# Patient Record
Sex: FEMALE | Race: BLACK OR AFRICAN AMERICAN | NOT HISPANIC OR LATINO
[De-identification: names, ages, dates, MRNs, and addresses within clinical notes are randomized per-mention and may not be internally consistent; named-entity substitution may affect disease eponyms.]

---

## 2017-09-05 ENCOUNTER — TRANSCRIPTION ENCOUNTER (OUTPATIENT)
Age: 27
End: 2017-09-05

## 2020-01-20 ENCOUNTER — TRANSCRIPTION ENCOUNTER (OUTPATIENT)
Age: 30
End: 2020-01-20

## 2020-07-20 ENCOUNTER — TRANSCRIPTION ENCOUNTER (OUTPATIENT)
Age: 30
End: 2020-07-20

## 2021-01-20 ENCOUNTER — TRANSCRIPTION ENCOUNTER (OUTPATIENT)
Age: 31
End: 2021-01-20

## 2021-09-27 ENCOUNTER — TRANSCRIPTION ENCOUNTER (OUTPATIENT)
Age: 31
End: 2021-09-27

## 2022-11-07 ENCOUNTER — NON-APPOINTMENT (OUTPATIENT)
Age: 32
End: 2022-11-07

## 2022-11-08 PROBLEM — Z00.00 ENCOUNTER FOR PREVENTIVE HEALTH EXAMINATION: Status: ACTIVE | Noted: 2022-11-08

## 2022-11-09 ENCOUNTER — APPOINTMENT (OUTPATIENT)
Dept: ANTEPARTUM | Facility: CLINIC | Age: 32
End: 2022-11-09

## 2022-11-09 ENCOUNTER — ASOB RESULT (OUTPATIENT)
Age: 32
End: 2022-11-09

## 2022-11-09 PROCEDURE — 76801 OB US < 14 WKS SINGLE FETUS: CPT

## 2022-11-10 ENCOUNTER — APPOINTMENT (OUTPATIENT)
Dept: ANTEPARTUM | Facility: CLINIC | Age: 32
End: 2022-11-10

## 2022-11-10 ENCOUNTER — ASOB RESULT (OUTPATIENT)
Age: 32
End: 2022-11-10

## 2022-11-10 PROCEDURE — 36415 COLL VENOUS BLD VENIPUNCTURE: CPT

## 2022-11-10 PROCEDURE — 76813 OB US NUCHAL MEAS 1 GEST: CPT

## 2022-11-16 LAB
ADDITIONAL US: NORMAL
COMMENTS: AFP: NORMAL
CRL SCAN TWIN B: NORMAL
CRL SCAN: NORMAL
CROWN RUMP LENGTH TWIN B: NORMAL
CROWN RUMP LENGTH: 68.9 MM
DOWN SYNDROME AGE RISK: NORMAL
DOWN SYNDROME INTERPRETATION: NORMAL
DOWN SYNDROME SCREENING RISK: NORMAL
GEST. AGE ON COLLECTION DATE: 13 WEEKS
HCG MOM: 0.81
HCG VALUE: 79.9 IU/ML
MATERNAL AGE AT EDD: 31.7 YR
NOTE: AFP: NORMAL
NT MOM TWIN B: NORMAL
NT TWIN B: NORMAL
NUCHAL TRANSLUCENCY (NT): 2.1 MM
NUCHAL TRANSLUCENCY MOM: 1.54
NUMBER OF FETUSES: 1
PAPP-A MOM: 0.6
PAPP-A VALUE: 779.2 NG/ML
RACE: NORMAL
RESULTS AFP: NORMAL
SONOGRAPHER ID#: NORMAL
SUBMIT PART 2 SAMPLE USING: NORMAL
TEST RESULTS: AFP: NORMAL
TRISOMY 18 AGE RISK: NORMAL
TRISOMY 18 INTERPRETATION: NORMAL
TRISOMY 18 SCREENING RISK: NORMAL
WEIGHT AFP: 135 LBS

## 2023-01-06 ENCOUNTER — ASOB RESULT (OUTPATIENT)
Age: 33
End: 2023-01-06

## 2023-01-06 ENCOUNTER — APPOINTMENT (OUTPATIENT)
Dept: ANTEPARTUM | Facility: CLINIC | Age: 33
End: 2023-01-06
Payer: COMMERCIAL

## 2023-01-06 PROCEDURE — 76805 OB US >/= 14 WKS SNGL FETUS: CPT | Mod: 59

## 2023-01-06 PROCEDURE — 76817 TRANSVAGINAL US OBSTETRIC: CPT

## 2023-01-28 ENCOUNTER — EMERGENCY (EMERGENCY)
Facility: HOSPITAL | Age: 33
LOS: 1 days | Discharge: HOME | End: 2023-01-28
Attending: EMERGENCY MEDICINE | Admitting: EMERGENCY MEDICINE
Payer: COMMERCIAL

## 2023-01-28 VITALS
SYSTOLIC BLOOD PRESSURE: 129 MMHG | DIASTOLIC BLOOD PRESSURE: 82 MMHG | HEART RATE: 90 BPM | OXYGEN SATURATION: 99 % | TEMPERATURE: 98 F | RESPIRATION RATE: 16 BRPM

## 2023-01-28 VITALS
HEIGHT: 63 IN | RESPIRATION RATE: 15 BRPM | WEIGHT: 152.12 LBS | SYSTOLIC BLOOD PRESSURE: 133 MMHG | OXYGEN SATURATION: 99 % | TEMPERATURE: 98 F | HEART RATE: 94 BPM | DIASTOLIC BLOOD PRESSURE: 84 MMHG

## 2023-01-28 LAB
ALBUMIN SERPL ELPH-MCNC: 3.2 G/DL — LOW (ref 3.3–5)
ALP SERPL-CCNC: 58 U/L — SIGNIFICANT CHANGE UP (ref 30–120)
ALT FLD-CCNC: 66 U/L DA — HIGH (ref 10–60)
ANION GAP SERPL CALC-SCNC: 9 MMOL/L — SIGNIFICANT CHANGE UP (ref 5–17)
APPEARANCE UR: CLEAR — SIGNIFICANT CHANGE UP
AST SERPL-CCNC: 33 U/L — SIGNIFICANT CHANGE UP (ref 10–40)
BASOPHILS # BLD AUTO: 0.06 K/UL — SIGNIFICANT CHANGE UP (ref 0–0.2)
BASOPHILS NFR BLD AUTO: 0.6 % — SIGNIFICANT CHANGE UP (ref 0–2)
BILIRUB SERPL-MCNC: 0.3 MG/DL — SIGNIFICANT CHANGE UP (ref 0.2–1.2)
BILIRUB UR-MCNC: NEGATIVE — SIGNIFICANT CHANGE UP
BUN SERPL-MCNC: 10 MG/DL — SIGNIFICANT CHANGE UP (ref 7–23)
CALCIUM SERPL-MCNC: 8.9 MG/DL — SIGNIFICANT CHANGE UP (ref 8.4–10.5)
CHLORIDE SERPL-SCNC: 100 MMOL/L — SIGNIFICANT CHANGE UP (ref 96–108)
CO2 SERPL-SCNC: 24 MMOL/L — SIGNIFICANT CHANGE UP (ref 22–31)
COLOR SPEC: YELLOW — SIGNIFICANT CHANGE UP
CREAT SERPL-MCNC: 0.87 MG/DL — SIGNIFICANT CHANGE UP (ref 0.5–1.3)
DIFF PNL FLD: NEGATIVE — SIGNIFICANT CHANGE UP
EGFR: 91 ML/MIN/1.73M2 — SIGNIFICANT CHANGE UP
EOSINOPHIL # BLD AUTO: 1.27 K/UL — HIGH (ref 0–0.5)
EOSINOPHIL NFR BLD AUTO: 11.7 % — HIGH (ref 0–6)
GLUCOSE SERPL-MCNC: 81 MG/DL — SIGNIFICANT CHANGE UP (ref 70–99)
GLUCOSE UR QL: NEGATIVE MG/DL — SIGNIFICANT CHANGE UP
HCG UR QL: POSITIVE
HCT VFR BLD CALC: 36.5 % — SIGNIFICANT CHANGE UP (ref 34.5–45)
HGB BLD-MCNC: 12 G/DL — SIGNIFICANT CHANGE UP (ref 11.5–15.5)
IMM GRANULOCYTES NFR BLD AUTO: 0.3 % — SIGNIFICANT CHANGE UP (ref 0–0.9)
KETONES UR-MCNC: ABNORMAL
LEUKOCYTE ESTERASE UR-ACNC: ABNORMAL
LIDOCAIN IGE QN: 122 U/L — SIGNIFICANT CHANGE UP (ref 73–393)
LYMPHOCYTES # BLD AUTO: 1.47 K/UL — SIGNIFICANT CHANGE UP (ref 1–3.3)
LYMPHOCYTES # BLD AUTO: 13.5 % — SIGNIFICANT CHANGE UP (ref 13–44)
MAGNESIUM SERPL-MCNC: 1.8 MG/DL — SIGNIFICANT CHANGE UP (ref 1.6–2.6)
MCHC RBC-ENTMCNC: 29.3 PG — SIGNIFICANT CHANGE UP (ref 27–34)
MCHC RBC-ENTMCNC: 32.9 GM/DL — SIGNIFICANT CHANGE UP (ref 32–36)
MCV RBC AUTO: 89.2 FL — SIGNIFICANT CHANGE UP (ref 80–100)
MONOCYTES # BLD AUTO: 0.53 K/UL — SIGNIFICANT CHANGE UP (ref 0–0.9)
MONOCYTES NFR BLD AUTO: 4.9 % — SIGNIFICANT CHANGE UP (ref 2–14)
NEUTROPHILS # BLD AUTO: 7.49 K/UL — HIGH (ref 1.8–7.4)
NEUTROPHILS NFR BLD AUTO: 69 % — SIGNIFICANT CHANGE UP (ref 43–77)
NITRITE UR-MCNC: NEGATIVE — SIGNIFICANT CHANGE UP
NRBC # BLD: 0 /100 WBCS — SIGNIFICANT CHANGE UP (ref 0–0)
PH UR: 6.5 — SIGNIFICANT CHANGE UP (ref 5–8)
PHOSPHATE SERPL-MCNC: 3.1 MG/DL — SIGNIFICANT CHANGE UP (ref 2.5–4.5)
PLATELET # BLD AUTO: 306 K/UL — SIGNIFICANT CHANGE UP (ref 150–400)
POTASSIUM SERPL-MCNC: 3.4 MMOL/L — LOW (ref 3.5–5.3)
POTASSIUM SERPL-SCNC: 3.4 MMOL/L — LOW (ref 3.5–5.3)
PROT SERPL-MCNC: 7.6 G/DL — SIGNIFICANT CHANGE UP (ref 6–8.3)
PROT UR-MCNC: 30 MG/DL
RBC # BLD: 4.09 M/UL — SIGNIFICANT CHANGE UP (ref 3.8–5.2)
RBC # FLD: 12.5 % — SIGNIFICANT CHANGE UP (ref 10.3–14.5)
SODIUM SERPL-SCNC: 133 MMOL/L — LOW (ref 135–145)
SP GR SPEC: 1.02 — SIGNIFICANT CHANGE UP (ref 1.01–1.02)
UROBILINOGEN FLD QL: 8 MG/DL
WBC # BLD: 10.85 K/UL — HIGH (ref 3.8–10.5)
WBC # FLD AUTO: 10.85 K/UL — HIGH (ref 3.8–10.5)

## 2023-01-28 PROCEDURE — 83735 ASSAY OF MAGNESIUM: CPT

## 2023-01-28 PROCEDURE — 81025 URINE PREGNANCY TEST: CPT

## 2023-01-28 PROCEDURE — 85025 COMPLETE CBC W/AUTO DIFF WBC: CPT

## 2023-01-28 PROCEDURE — 81001 URINALYSIS AUTO W/SCOPE: CPT

## 2023-01-28 PROCEDURE — 99283 EMERGENCY DEPT VISIT LOW MDM: CPT

## 2023-01-28 PROCEDURE — 80053 COMPREHEN METABOLIC PANEL: CPT

## 2023-01-28 PROCEDURE — 84100 ASSAY OF PHOSPHORUS: CPT

## 2023-01-28 PROCEDURE — 36000 PLACE NEEDLE IN VEIN: CPT

## 2023-01-28 PROCEDURE — 83690 ASSAY OF LIPASE: CPT

## 2023-01-28 PROCEDURE — 99284 EMERGENCY DEPT VISIT MOD MDM: CPT

## 2023-01-28 PROCEDURE — 36415 COLL VENOUS BLD VENIPUNCTURE: CPT

## 2023-01-28 RX ORDER — SODIUM CHLORIDE 9 MG/ML
1000 INJECTION INTRAMUSCULAR; INTRAVENOUS; SUBCUTANEOUS ONCE
Refills: 0 | Status: COMPLETED | OUTPATIENT
Start: 2023-01-28 | End: 2023-01-28

## 2023-01-28 RX ADMIN — SODIUM CHLORIDE 1000 MILLILITER(S): 9 INJECTION INTRAMUSCULAR; INTRAVENOUS; SUBCUTANEOUS at 18:02

## 2023-01-28 NOTE — ED PROVIDER NOTE - CLINICAL SUMMARY MEDICAL DECISION MAKING FREE TEXT BOX
Patient is a 32-year-old female reportedly approximately 6 months pregnant presents to the emergency room with a diffuse itchy rash.  Past medical history of eczema had been on clotrimazole cream.  She is a  approximately 6 months pregnant.  She reports that since her pregnancy she has developed worsening skin dryness with diffuse itching.  She has followed up with her OB reports it is okay for her to use her clotrimazole cream but she states this is no longer helping.  She is also followed up with her PMD and her dermatologist both of which have no real suggestions.  She did report that on her last visit to dermatology she was told that they could maybe try UV therapy but she has not started this.  She reports that the itching is becoming unbearable so she came to the emergency room for further work-up.  Denies fevers chills nausea vomiting chest pain shortness of breath abdominal pain or cramping.  Denies vaginal bleeding or discharge.  Denies dysuria urinary frequency urgency or gross hematuria.  On exam patient is lying in bed does appear to be anxious and itching normocephalic atraumatic her pupils are equal round and reactive her heart is regular rate clear to auscultation abdomen soft nontender nondistended.  Patient appears to have eczema noted diffusely to the body.  There is no evidence of superimposed cellulitis.  There is some superficial abrasions from scratching.  No hives noted.  No vesicular lesions noted.  Patient is presenting with itching in the setting of eczema and pregnancy.  Eczema is likely worsened due to hormonal changes.  Will obtain screening labs check magnesium and phosphorus as this can sometimes lead to itching we will check UA urine culture will hydrate.  Results of labs noted no significant abnormality patient with large ketones likely dehydrated IV fluid was given.  Advised on importance of maintaining hydration both with topical creams and with water as this will help with itching.  Also advised to use perfume free dye free products and ones with oatmeal.  Patient reports that she is doing this and sometimes uses oatmeal baths.  Also instructed that sometimes ice topically to the affected regions to help with itching.  Advised to follow-up with her dermatologist for possible UV therapy.  Also advised to only take cool showers as warm showers can sometimes dry the skin and worsen the reaction.  Mother at bedside in agreement with plan stable for discharge home.

## 2023-01-28 NOTE — ED ADULT NURSE NOTE - CADM POA PRESS ULCER
Detail Level: Zone
Continue Regimen: Metronidazole cream twice a day  \\nMoisturizer with Sunscreen daily
Detail Level: Simple
Modify Regimen: Over the counter Eucerin Roughness Relief Spot treatment cream twice a day as needed
No

## 2023-01-28 NOTE — ED PROVIDER NOTE - OBJECTIVE STATEMENT
32 F 6 months pregnant c/o diffuse itching since pregnancy started. First pregnancy. Has seen her pmd, ob/gyn, and a dermatologist for this and states nothing is being done to help, needs to wait til delivery. Denies f/c, cp, sob, bleeding, urinary complaints, abd pain.

## 2023-01-28 NOTE — ED ADULT TRIAGE NOTE - CHIEF COMPLAINT QUOTE
I have itchiness all over body with pain, seen by many doctors without help.  patient is 6 months pregnant due date 5/22/23.

## 2023-01-28 NOTE — ED PROVIDER NOTE - PATIENT PORTAL LINK FT
You can access the FollowMyHealth Patient Portal offered by [facility] by registering at the following website: [email or web address]. By joining [employer]’s FollowMyHealth portal, you will also be able to view your health information using other applications (apps) compatible with our system.

## 2023-01-28 NOTE — ED PROVIDER NOTE - NSFOLLOWUPINSTRUCTIONS_ED_ALL_ED_FT
Stay hydrated.  Continue follow up with your doctors.  Return for worsening or concerning symptoms.          Pruritus is an itchy feeling on the skin. One of the most common causes is dry skin, but many different things can cause itching. Most cases of itching do not require medical attention. Sometimes itchy skin can turn into a rash.      Follow these instructions at home:      Skin care   A person applying skin lotion moisturizer to the hands.   •Apply moisturizing lotion to your skin as needed. Lotion that contains petroleum jelly is best.    •Take medicines or apply medicated creams only as told by your health care provider. This may include:  •Corticosteroid cream.      •Anti-itch lotions.      •Oral antihistamines.        •Apply a cool, wet cloth (cool compress) to the affected areas.    •Take baths with one of the following:  •Epsom salts. You can get these at your local pharmacy or grocery store. Follow the instructions on the packaging.      •Baking soda. Pour a small amount into the bath as told by your health care provider.      •Colloidal oatmeal. You can get this at your local pharmacy or grocery store. Follow the instructions on the packaging.        •Apply baking soda paste to your skin. To make the paste, stir water into a small amount of baking soda until it reaches a paste-like consistency.      • Do not scratch your skin.      • Do not take hot showers or baths, which can make itching worse. A cool shower may help with itching as long as you apply moisturizing lotion after the shower.      • Do not use scented soaps, detergents, perfumes, and cosmetic products. Instead, use gentle, unscented versions of these items.      General instructions     •Avoid wearing tight clothes.    •Keep a journal to help find out what is causing your itching. Write down:  •What you eat and drink.      •What cosmetic products you use.      •What soaps or detergents you use.      •What you wear, including jewelry.        •Use a humidifier. This keeps the air moist, which helps to prevent dry skin.      •Be aware of any changes in your itchiness.        Contact a health care provider if:    •The itching does not go away after several days.      •You are unusually thirsty or urinating more than normal.      •Your skin tingles or feels numb.      •Your skin or the white parts of your eyes turn yellow (jaundice).      •You feel weak.    •You have any of the following:  •Night sweats.      •Tiredness (fatigue).      •Weight loss.      •Abdominal pain.          Summary    •Pruritus is an itchy feeling on the skin. One of the most common causes is dry skin, but many different conditions and factors can cause itching.      •Apply moisturizing lotion to your skin as needed. Lotion that contains petroleum jelly is best.      •Take medicines or apply medicated creams only as told by your health care provider.      • Do not take hot showers or baths. Do not use scented soaps, detergents, perfumes, or cosmetic products.      This information is not intended to replace advice given to you by your health care provider. Make sure you discuss any questions you have with your health care provider.

## 2023-01-28 NOTE — ED ADULT NURSE NOTE - OBJECTIVE STATEMENT
32 YOF A&OX3 who is 6 months pregnant presents for body itching. pt states has had body itching for months has seen many doctors with no findings. pt denies fevers/chills, sob, chest pain, n/v/d, headaches, dizziness. safety maintained.

## 2023-01-28 NOTE — ED PROVIDER NOTE - NS ED ATTENDING STATEMENT MOD
This was a shared visit with the YOLA. I reviewed and verified the documentation and independently performed the documented:

## 2023-01-28 NOTE — ED ADULT NURSE NOTE - CHPI ED NUR SYMPTOMS POS
Elevated enzymes likely secondary to shock and transient ischemia - improving  Avoid hepatotoxins and monitor   ITCHING

## 2023-03-03 ENCOUNTER — ASOB RESULT (OUTPATIENT)
Age: 33
End: 2023-03-03

## 2023-03-03 ENCOUNTER — APPOINTMENT (OUTPATIENT)
Dept: ANTEPARTUM | Facility: CLINIC | Age: 33
End: 2023-03-03
Payer: COMMERCIAL

## 2023-03-03 PROCEDURE — 76819 FETAL BIOPHYS PROFIL W/O NST: CPT

## 2023-03-03 PROCEDURE — 76816 OB US FOLLOW-UP PER FETUS: CPT

## 2023-03-17 ENCOUNTER — APPOINTMENT (OUTPATIENT)
Dept: OBGYN | Facility: CLINIC | Age: 33
End: 2023-03-17
Payer: COMMERCIAL

## 2023-03-17 VITALS
BODY MASS INDEX: 24.8 KG/M2 | HEIGHT: 63 IN | WEIGHT: 140 LBS | SYSTOLIC BLOOD PRESSURE: 112 MMHG | DIASTOLIC BLOOD PRESSURE: 60 MMHG

## 2023-03-17 LAB
BILIRUB UR QL STRIP: NORMAL
GLUCOSE UR-MCNC: NORMAL
HCG UR QL: 0.2 EU/DL
HGB UR QL STRIP.AUTO: NORMAL
KETONES UR-MCNC: ABNORMAL
LEUKOCYTE ESTERASE UR QL STRIP: ABNORMAL
NITRITE UR QL STRIP: NORMAL
PH UR STRIP: 7
PROT UR STRIP-MCNC: ABNORMAL
SP GR UR STRIP: 1.02

## 2023-03-17 PROCEDURE — 0500F INITIAL PRENATAL CARE VISIT: CPT

## 2023-03-17 PROCEDURE — 36415 COLL VENOUS BLD VENIPUNCTURE: CPT

## 2023-03-17 RX ORDER — LORATADINE 10 MG/1
10 TABLET ORAL
Qty: 30 | Refills: 0 | Status: ACTIVE | COMMUNITY
Start: 2023-03-17 | End: 1900-01-01

## 2023-03-21 ENCOUNTER — APPOINTMENT (OUTPATIENT)
Dept: ANTEPARTUM | Facility: CLINIC | Age: 33
End: 2023-03-21

## 2023-03-21 ENCOUNTER — APPOINTMENT (OUTPATIENT)
Dept: MATERNAL FETAL MEDICINE | Facility: CLINIC | Age: 33
End: 2023-03-21
Payer: COMMERCIAL

## 2023-03-21 VITALS
HEIGHT: 63 IN | WEIGHT: 139 LBS | BODY MASS INDEX: 24.63 KG/M2 | RESPIRATION RATE: 16 BRPM | SYSTOLIC BLOOD PRESSURE: 92 MMHG | DIASTOLIC BLOOD PRESSURE: 62 MMHG | OXYGEN SATURATION: 99 % | HEART RATE: 88 BPM

## 2023-03-21 DIAGNOSIS — O26.86 PRURITIC URTICARIAL PAPULES AND PLAQUES OF PREGNANCY (PUPPP): ICD-10-CM

## 2023-03-21 DIAGNOSIS — Z28.39 SUPERVISION OF OTHER HIGH RISK PREGNANCIES, UNSPECIFIED TRIMESTER: ICD-10-CM

## 2023-03-21 DIAGNOSIS — Z87.2 PERSONAL HISTORY OF DISEASES OF THE SKIN AND SUBCUTANEOUS TISSUE: ICD-10-CM

## 2023-03-21 DIAGNOSIS — O09.899 SUPERVISION OF OTHER HIGH RISK PREGNANCIES, UNSPECIFIED TRIMESTER: ICD-10-CM

## 2023-03-21 PROCEDURE — 99205 OFFICE O/P NEW HI 60 MIN: CPT

## 2023-03-21 NOTE — FAMILY HISTORY
[Age 35+ During Pregnancy] : not 35 or over during pregnancy [Reported Family History Of Birth Defects] : no congenital heart defects [Herminio-Sachs Carrier] : no Herminio-Sachs [Family History] : no mental retardation/autism [Reported Family History Of Genetic Disease] : no history of child defect in child of baby father

## 2023-03-21 NOTE — VITALS
[LMP (date): ___] : LMP was on [unfilled] [GA =___ Weeks] : which calculates to a GA of [unfilled] weeks [GA= ___ Days] : and [unfilled] day(s) [VICKI by LMP (date): ___] : The calculated VICKI by LMP is [unfilled] [By LMP] : this is the final VICKI

## 2023-03-22 NOTE — HISTORY OF PRESENT ILLNESS
[FreeTextEntry1] : Elisa Landon is a 32 y.o.  with LMP of 8/15/22 and VICKI of 23 who presents at 31w1d for  consultation secondary to dermatosis of pregnancy.  Her BMI is 24 kg/m2. Elisa has a known history of eczema and has treated prior exacerbations with topical steroids and subsequent resolution in symptoms.  In her current pregnancy, she experienced worsening eczema symptoms since the first trimester, with progression to facial and scalp involvement.  She started UV therapy, dietary modification, and topical steroids with no improvement.  In the past two weeks, she has experienced worsening pruritus and induration on her palms and new appearing papular rash on all extremities and her abdomen.  She has been evaluated for ICP on multiple occasions with negative bile acids.  She reports no symptoms concerning for rheumatologic complaint.  She otherwise reports no other pregnancy complication to date.  On the day of consultation, she confirms fetal movement and reports no obstetrical or constitutional complaint.

## 2023-03-22 NOTE — PHYSICAL EXAM
[FreeTextEntry1] : Well appearing female with generalized pruritus. FHR noted at 150 bpm.\par \par No facial rash.  Indurated papules and scattered pustules noted on abdomen, upper extremities, and lower extremities.  Most notable on lower extremities with excoriated outline on papules.  Palms with increased pigmentation and induration and cracked skin noted in palmar creases.

## 2023-03-22 NOTE — DATA REVIEWED
[FreeTextEntry1] : TAUS 3/3/23 - cephalic, posterior placenta, EFW 1353 gm (61%), HARI 12.42 cm, BPP 8/8\par \par 3/17/23\par Bile acids 2.6\par \par

## 2023-03-22 NOTE — OB HISTORY
[___] : #1 ([unfilled]): [LMP: ___] : LMP: [unfilled] [VICKI: ___] : VICKI: [unfilled] [EGA: ___ wks] : EGA: [unfilled] wks [Spontaneous] : Spontaneous conception [Definite:  ___ (Date)] : the last menstrual period was [unfilled] [Normal Amount/Duration] : was of a normal amount and duration [Regular Cycle Intervals] : periods have been regular [Menstrual Cramps] : menstrual cramps [FreeTextEntry1] : Known history of eczema\par Worsening symptoms since first trimester, with facial and scalp involvement\par Started UV therapy, dietary modification, and topical steroids with no improvement\par Now experiencing pruritus and induration on palms and papular rash on all extremities and abdomen [Spotting Between  Menses] : no spotting between menses

## 2023-03-22 NOTE — DISCUSSION/SUMMARY
[FreeTextEntry1] : At the time of consultation, we reviewed that skin, hair, nails, and mucous membranes undergo normal physiologic change in pregnancy.  Additionally, preexisting cutaneous disorders may be exacerbated during pregnancy.  Based on her clinical history, it appears that her initial symptoms are consistent with an exacerbation of her underlying eczema.  She has tried UV light therapy, dietary modification, and topical steroids with minimal relief.  In the past several weeks, the character of her rash and severity of symptoms have changed and appear consistent with pruritic urticarial papules and plaques of pregnancy or PUPP.  This is a dermatosis of pregnancy as it occurs exclusively during pregnancy and the puerperium.  Symptoms most typically appear in the third trimester and are characterized by erythematous papules which initially begin in the abdominal striae.  Lesions then spread to extremities and coalesce to form urticarial papules.  The rash is associated with extreme pruritus and is often spares the umbilicus. Treatment includes low to mid potency topical corticosteroids, nonsedating oral antihistamines, and systemic glucocorticoids.  Elisa has been hesitant to begin oral steroid therapy and were reviewed the risks and benefits in pregnancy.  After counseling, she has opted to try a short course of steroids and a prescription for a Medrol Dose Alonzo was sent to her pharmacy.  She was also prescribed topical betamethasone valerate which is a mid potency topical steroid.  We reviewed the use of loratadine or cetirizine as a nonsedating oral antihistamine.  Finally, she was reassured there is no increased risk of fetal morbidity related to this diagnosis.  She will continue serial ultrasound evaluation of fetal growth and we will follow up in one week to discuss symptoms control.  All questions were answered to the best of my ability.

## 2023-03-22 NOTE — SURGICAL HISTORY
[Fibroids] : fibroids [Abn Paps] : no abnormal pap smears [Breast Disease] : no breast disease [STI's] : no STI's [Infertility] : no infertility [Cysts] : no cysts [OC Use] : no OC use

## 2023-03-29 LAB
ALBUMIN SERPL ELPH-MCNC: 3.8 G/DL
ALP BLD-CCNC: 72 U/L
ALT SERPL-CCNC: 22 U/L
ANION GAP SERPL CALC-SCNC: 15 MMOL/L
AST SERPL-CCNC: 21 U/L
BASOPHILS # BLD AUTO: 0.03 K/UL
BASOPHILS NFR BLD AUTO: 0.3 %
BILE AC SER-MCNC: 2.6 UMOL/L
BILIRUB SERPL-MCNC: 0.3 MG/DL
BUN SERPL-MCNC: 7 MG/DL
CALCIUM SERPL-MCNC: 9.2 MG/DL
CHLORIDE SERPL-SCNC: 99 MMOL/L
CO2 SERPL-SCNC: 20 MMOL/L
CREAT SERPL-MCNC: 0.87 MG/DL
EGFR: 91 ML/MIN/1.73M2
EOSINOPHIL # BLD AUTO: 0.21 K/UL
EOSINOPHIL NFR BLD AUTO: 2.4 %
GLUCOSE SERPL-MCNC: 41 MG/DL
HCT VFR BLD CALC: 35.9 %
HGB BLD-MCNC: 11.9 G/DL
IGE SER-MCNC: 2961 KU/L
IMM GRANULOCYTES NFR BLD AUTO: 1 %
LYMPHOCYTES # BLD AUTO: 1.42 K/UL
LYMPHOCYTES NFR BLD AUTO: 16.1 %
MAN DIFF?: NORMAL
MCHC RBC-ENTMCNC: 28.8 PG
MCHC RBC-ENTMCNC: 33.1 GM/DL
MCV RBC AUTO: 86.9 FL
MONOCYTES # BLD AUTO: 0.6 K/UL
MONOCYTES NFR BLD AUTO: 6.8 %
NEUTROPHILS # BLD AUTO: 6.46 K/UL
NEUTROPHILS NFR BLD AUTO: 73.4 %
PLATELET # BLD AUTO: 233 K/UL
POTASSIUM SERPL-SCNC: 4.4 MMOL/L
PROT SERPL-MCNC: 6.5 G/DL
RBC # BLD: 4.13 M/UL
RBC # FLD: 13 %
SODIUM SERPL-SCNC: 134 MMOL/L
WBC # FLD AUTO: 8.81 K/UL

## 2023-03-30 ENCOUNTER — NON-APPOINTMENT (OUTPATIENT)
Age: 33
End: 2023-03-30

## 2023-03-31 ENCOUNTER — NON-APPOINTMENT (OUTPATIENT)
Age: 33
End: 2023-03-31

## 2023-03-31 ENCOUNTER — APPOINTMENT (OUTPATIENT)
Dept: OBGYN | Facility: CLINIC | Age: 33
End: 2023-03-31
Payer: COMMERCIAL

## 2023-03-31 VITALS
BODY MASS INDEX: 25.34 KG/M2 | HEIGHT: 63 IN | WEIGHT: 143 LBS | DIASTOLIC BLOOD PRESSURE: 60 MMHG | SYSTOLIC BLOOD PRESSURE: 108 MMHG

## 2023-03-31 LAB
BILIRUB UR QL STRIP: NORMAL
CLARITY UR: CLEAR
COLLECTION METHOD: NORMAL
GLUCOSE UR-MCNC: NORMAL
HCG UR QL: 1 EU/DL
HGB UR QL STRIP.AUTO: NORMAL
KETONES UR-MCNC: NORMAL
LEUKOCYTE ESTERASE UR QL STRIP: NORMAL
NITRITE UR QL STRIP: NORMAL
PH UR STRIP: 7
PROT UR STRIP-MCNC: ABNORMAL
SP GR UR STRIP: 1.02

## 2023-03-31 PROCEDURE — 0502F SUBSEQUENT PRENATAL CARE: CPT

## 2023-03-31 PROCEDURE — 90715 TDAP VACCINE 7 YRS/> IM: CPT

## 2023-03-31 PROCEDURE — 90471 IMMUNIZATION ADMIN: CPT

## 2023-04-06 ENCOUNTER — OUTPATIENT (OUTPATIENT)
Dept: OUTPATIENT SERVICES | Facility: HOSPITAL | Age: 33
LOS: 1 days | End: 2023-04-06
Payer: COMMERCIAL

## 2023-04-06 DIAGNOSIS — L97.522 NON-PRESSURE CHRONIC ULCER OF OTHER PART OF LEFT FOOT WITH FAT LAYER EXPOSED: ICD-10-CM

## 2023-04-06 PROCEDURE — 99203 OFFICE O/P NEW LOW 30 MIN: CPT

## 2023-04-13 ENCOUNTER — NON-APPOINTMENT (OUTPATIENT)
Age: 33
End: 2023-04-13

## 2023-04-13 ENCOUNTER — RX RENEWAL (OUTPATIENT)
Age: 33
End: 2023-04-13

## 2023-04-13 RX ORDER — BETAMETHASONE VALERATE 1.2 MG/G
0.1 OINTMENT TOPICAL TWICE DAILY
Qty: 45 | Refills: 2 | Status: ACTIVE | COMMUNITY
Start: 2023-03-21 | End: 1900-01-01

## 2023-04-14 ENCOUNTER — NON-APPOINTMENT (OUTPATIENT)
Age: 33
End: 2023-04-14

## 2023-04-14 ENCOUNTER — APPOINTMENT (OUTPATIENT)
Dept: OBGYN | Facility: CLINIC | Age: 33
End: 2023-04-14
Payer: COMMERCIAL

## 2023-04-14 VITALS
BODY MASS INDEX: 25.87 KG/M2 | WEIGHT: 146 LBS | HEIGHT: 63 IN | DIASTOLIC BLOOD PRESSURE: 76 MMHG | SYSTOLIC BLOOD PRESSURE: 112 MMHG

## 2023-04-14 LAB
BILIRUB UR QL STRIP: NORMAL
CLARITY UR: CLEAR
COLLECTION METHOD: NORMAL
GLUCOSE UR-MCNC: NORMAL
HCG UR QL: 0.2 EU/DL
HGB UR QL STRIP.AUTO: NORMAL
KETONES UR-MCNC: 15
LEUKOCYTE ESTERASE UR QL STRIP: NORMAL
NITRITE UR QL STRIP: NORMAL
PH UR STRIP: 6.5
PROT UR STRIP-MCNC: NORMAL
SP GR UR STRIP: 1.02

## 2023-04-14 PROCEDURE — 0502F SUBSEQUENT PRENATAL CARE: CPT

## 2023-04-14 RX ORDER — HYDROXYZINE HYDROCHLORIDE 10 MG/1
10 TABLET ORAL TWICE DAILY
Qty: 60 | Refills: 3 | Status: ACTIVE | COMMUNITY
Start: 2023-04-14 | End: 1900-01-01

## 2023-04-14 RX ORDER — METHYLPREDNISOLONE 4 MG/1
4 TABLET ORAL
Qty: 1 | Refills: 0 | Status: ACTIVE | COMMUNITY
Start: 2023-03-21 | End: 1900-01-01

## 2023-04-21 DIAGNOSIS — D64.9 ANEMIA, UNSPECIFIED: ICD-10-CM

## 2023-04-21 DIAGNOSIS — Z3A.34 34 WEEKS GESTATION OF PREGNANCY: ICD-10-CM

## 2023-04-21 DIAGNOSIS — O26.86 PRURITIC URTICARIAL PAPULES AND PLAQUES OF PREGNANCY (PUPPP): ICD-10-CM

## 2023-04-27 ENCOUNTER — NON-APPOINTMENT (OUTPATIENT)
Age: 33
End: 2023-04-27

## 2023-04-27 ENCOUNTER — APPOINTMENT (OUTPATIENT)
Dept: ANTEPARTUM | Facility: CLINIC | Age: 33
End: 2023-04-27
Payer: COMMERCIAL

## 2023-04-27 ENCOUNTER — ASOB RESULT (OUTPATIENT)
Age: 33
End: 2023-04-27

## 2023-04-27 ENCOUNTER — APPOINTMENT (OUTPATIENT)
Dept: OBGYN | Facility: CLINIC | Age: 33
End: 2023-04-27
Payer: COMMERCIAL

## 2023-04-27 VITALS
WEIGHT: 151 LBS | SYSTOLIC BLOOD PRESSURE: 118 MMHG | DIASTOLIC BLOOD PRESSURE: 62 MMHG | HEIGHT: 63 IN | BODY MASS INDEX: 26.75 KG/M2

## 2023-04-27 PROCEDURE — 0502F SUBSEQUENT PRENATAL CARE: CPT

## 2023-04-27 PROCEDURE — 76816 OB US FOLLOW-UP PER FETUS: CPT

## 2023-04-27 PROCEDURE — 36415 COLL VENOUS BLD VENIPUNCTURE: CPT

## 2023-04-28 ENCOUNTER — APPOINTMENT (OUTPATIENT)
Dept: ANTEPARTUM | Facility: CLINIC | Age: 33
End: 2023-04-28

## 2023-05-01 LAB
B-HEM STREP SPEC QL CULT: NORMAL
BILIRUB UR QL STRIP: ABNORMAL
GLUCOSE UR-MCNC: NORMAL
HCG UR QL: 1 EU/DL
HGB UR QL STRIP.AUTO: NORMAL
HIV1+2 AB SPEC QL IA.RAPID: NONREACTIVE
KETONES UR-MCNC: ABNORMAL
LEUKOCYTE ESTERASE UR QL STRIP: NORMAL
NITRITE UR QL STRIP: NORMAL
PH UR STRIP: 6
PROT UR STRIP-MCNC: ABNORMAL
SP GR UR STRIP: 1.02
T PALLIDUM AB SER QL IA: NEGATIVE

## 2023-05-05 ENCOUNTER — TRANSCRIPTION ENCOUNTER (OUTPATIENT)
Age: 33
End: 2023-05-05

## 2023-05-05 ENCOUNTER — APPOINTMENT (OUTPATIENT)
Dept: OBGYN | Facility: CLINIC | Age: 33
End: 2023-05-05
Payer: COMMERCIAL

## 2023-05-05 ENCOUNTER — INPATIENT (INPATIENT)
Facility: HOSPITAL | Age: 33
LOS: 2 days | Discharge: HOME | End: 2023-05-08
Attending: STUDENT IN AN ORGANIZED HEALTH CARE EDUCATION/TRAINING PROGRAM | Admitting: STUDENT IN AN ORGANIZED HEALTH CARE EDUCATION/TRAINING PROGRAM
Payer: COMMERCIAL

## 2023-05-05 VITALS
RESPIRATION RATE: 18 BRPM | DIASTOLIC BLOOD PRESSURE: 77 MMHG | SYSTOLIC BLOOD PRESSURE: 117 MMHG | HEART RATE: 93 BPM | TEMPERATURE: 98 F

## 2023-05-05 DIAGNOSIS — O47.1 FALSE LABOR AT OR AFTER 37 COMPLETED WEEKS OF GESTATION: ICD-10-CM

## 2023-05-05 DIAGNOSIS — Z98.890 OTHER SPECIFIED POSTPROCEDURAL STATES: Chronic | ICD-10-CM

## 2023-05-05 DIAGNOSIS — O26.893 OTHER SPECIFIED PREGNANCY RELATED CONDITIONS, THIRD TRIMESTER: ICD-10-CM

## 2023-05-05 DIAGNOSIS — Z34.93 ENCOUNTER FOR SUPERVISION OF NORMAL PREGNANCY, UNSPECIFIED, THIRD TRIMESTER: ICD-10-CM

## 2023-05-05 LAB
ABO RH CONFIRMATION: SIGNIFICANT CHANGE UP
ALBUMIN SERPL ELPH-MCNC: 3.7 G/DL — SIGNIFICANT CHANGE UP (ref 3.3–5.2)
ALP SERPL-CCNC: 119 U/L — SIGNIFICANT CHANGE UP (ref 40–120)
ALT FLD-CCNC: 14 U/L — SIGNIFICANT CHANGE UP
ANION GAP SERPL CALC-SCNC: 13 MMOL/L — SIGNIFICANT CHANGE UP (ref 5–17)
AST SERPL-CCNC: 15 U/L — SIGNIFICANT CHANGE UP
BASOPHILS # BLD AUTO: 0.03 K/UL — SIGNIFICANT CHANGE UP (ref 0–0.2)
BASOPHILS NFR BLD AUTO: 0.3 % — SIGNIFICANT CHANGE UP (ref 0–2)
BILIRUB SERPL-MCNC: <0.2 MG/DL — LOW (ref 0.4–2)
BLD GP AB SCN SERPL QL: SIGNIFICANT CHANGE UP
BUN SERPL-MCNC: 9.7 MG/DL — SIGNIFICANT CHANGE UP (ref 8–20)
CALCIUM SERPL-MCNC: 8.8 MG/DL — SIGNIFICANT CHANGE UP (ref 8.4–10.5)
CHLORIDE SERPL-SCNC: 102 MMOL/L — SIGNIFICANT CHANGE UP (ref 96–108)
CO2 SERPL-SCNC: 20 MMOL/L — LOW (ref 22–29)
CREAT SERPL-MCNC: 0.81 MG/DL — SIGNIFICANT CHANGE UP (ref 0.5–1.3)
EGFR: 99 ML/MIN/1.73M2 — SIGNIFICANT CHANGE UP
EOSINOPHIL # BLD AUTO: 0.23 K/UL — SIGNIFICANT CHANGE UP (ref 0–0.5)
EOSINOPHIL NFR BLD AUTO: 2.1 % — SIGNIFICANT CHANGE UP (ref 0–6)
GLUCOSE SERPL-MCNC: 99 MG/DL — SIGNIFICANT CHANGE UP (ref 70–99)
HCT VFR BLD CALC: 36.1 % — SIGNIFICANT CHANGE UP (ref 34.5–45)
HGB BLD-MCNC: 12.1 G/DL — SIGNIFICANT CHANGE UP (ref 11.5–15.5)
IMM GRANULOCYTES NFR BLD AUTO: 1 % — HIGH (ref 0–0.9)
LYMPHOCYTES # BLD AUTO: 1.38 K/UL — SIGNIFICANT CHANGE UP (ref 1–3.3)
LYMPHOCYTES # BLD AUTO: 12.7 % — LOW (ref 13–44)
MCHC RBC-ENTMCNC: 28.1 PG — SIGNIFICANT CHANGE UP (ref 27–34)
MCHC RBC-ENTMCNC: 33.5 GM/DL — SIGNIFICANT CHANGE UP (ref 32–36)
MCV RBC AUTO: 83.8 FL — SIGNIFICANT CHANGE UP (ref 80–100)
MONOCYTES # BLD AUTO: 0.65 K/UL — SIGNIFICANT CHANGE UP (ref 0–0.9)
MONOCYTES NFR BLD AUTO: 6 % — SIGNIFICANT CHANGE UP (ref 2–14)
NEUTROPHILS # BLD AUTO: 8.44 K/UL — HIGH (ref 1.8–7.4)
NEUTROPHILS NFR BLD AUTO: 77.9 % — HIGH (ref 43–77)
PLATELET # BLD AUTO: 231 K/UL — SIGNIFICANT CHANGE UP (ref 150–400)
POTASSIUM SERPL-MCNC: 3.8 MMOL/L — SIGNIFICANT CHANGE UP (ref 3.5–5.3)
POTASSIUM SERPL-SCNC: 3.8 MMOL/L — SIGNIFICANT CHANGE UP (ref 3.5–5.3)
PROT SERPL-MCNC: 6.4 G/DL — LOW (ref 6.6–8.7)
RBC # BLD: 4.31 M/UL — SIGNIFICANT CHANGE UP (ref 3.8–5.2)
RBC # FLD: 13 % — SIGNIFICANT CHANGE UP (ref 10.3–14.5)
SODIUM SERPL-SCNC: 135 MMOL/L — SIGNIFICANT CHANGE UP (ref 135–145)
WBC # BLD: 10.84 K/UL — HIGH (ref 3.8–10.5)
WBC # FLD AUTO: 10.84 K/UL — HIGH (ref 3.8–10.5)

## 2023-05-05 PROCEDURE — 59425 ANTEPARTUM CARE ONLY: CPT

## 2023-05-05 RX ORDER — SODIUM CHLORIDE 9 MG/ML
1000 INJECTION, SOLUTION INTRAVENOUS
Refills: 0 | Status: DISCONTINUED | OUTPATIENT
Start: 2023-05-05 | End: 2023-05-06

## 2023-05-05 RX ORDER — CITRIC ACID/SODIUM CITRATE 300-500 MG
30 SOLUTION, ORAL ORAL ONCE
Refills: 0 | Status: COMPLETED | OUTPATIENT
Start: 2023-05-05 | End: 2023-05-05

## 2023-05-05 RX ORDER — OXYTOCIN 10 UNIT/ML
333.33 VIAL (ML) INJECTION
Qty: 20 | Refills: 0 | Status: DISCONTINUED | OUTPATIENT
Start: 2023-05-05 | End: 2023-05-08

## 2023-05-05 RX ORDER — OXYTOCIN 10 UNIT/ML
2 VIAL (ML) INJECTION
Qty: 30 | Refills: 0 | Status: DISCONTINUED | OUTPATIENT
Start: 2023-05-05 | End: 2023-05-08

## 2023-05-05 RX ORDER — CHLORHEXIDINE GLUCONATE 213 G/1000ML
1 SOLUTION TOPICAL ONCE
Refills: 0 | Status: DISCONTINUED | OUTPATIENT
Start: 2023-05-05 | End: 2023-05-06

## 2023-05-05 RX ADMIN — Medication 2 MILLIUNIT(S)/MIN: at 23:37

## 2023-05-05 RX ADMIN — Medication 30 MILLILITER(S): at 22:00

## 2023-05-05 RX ADMIN — SODIUM CHLORIDE 125 MILLILITER(S): 9 INJECTION, SOLUTION INTRAVENOUS at 20:49

## 2023-05-05 RX ADMIN — SODIUM CHLORIDE 125 MILLILITER(S): 9 INJECTION, SOLUTION INTRAVENOUS at 22:00

## 2023-05-05 NOTE — OB PROVIDER H&P - ATTENDING COMMENTS
32y  @ 37w4d early term PROM, reassuring testing. Consider pitocin augmentation as needed. Consent for care signed after questions answered.

## 2023-05-05 NOTE — OB RN DELIVERY SUMMARY - NSCSDELIVATYPE_OBGYN_ALL_OB
Detail Level: Zone
Duration Of Freeze Thaw-Cycle (Seconds): 5-10
Number Of Freeze-Thaw Cycles: 1 freeze-thaw cycle
Consent: The patient's consent was obtained including but not limited to risks of crusting, scabbing, blistering, scarring, darker or lighter pigmentary change, recurrence, incomplete removal and infection.
Spray Paint Text: The liquid nitrogen was applied to the skin utilizing a spray paint frosting technique.
Medical Necessity Information: It is in your best interest to select a reason for this procedure from the list below. All of these items fulfill various CMS LCD requirements except the new and changing color options.
Show Applicator Variable?: Yes
Medical Necessity Clause: This procedure was medically necessary because the lesions that were treated were:
Render Note In Bullet Format When Appropriate: No
Post-Care Instructions: I reviewed with the patient in detail post-care instructions. Patient is to wear sunprotection, and avoid picking at any of the treated lesions. Pt may apply Vaseline to crusted or scabbing areas.
Primary

## 2023-05-05 NOTE — OB PROVIDER H&P - NSHPPHYSICALEXAM_GEN_ALL_CORE
ICU Vital Signs Last 24 Hrs  HR: 93 (05 May 2023 18:50) (93 - 93)  BP: 117/77 (05 May 2023 18:50) (117/77 - 117/77)        General: AOx3, NAD  Heart: RRR  Lungs: CTAB  Abd: Soft, nontender, gravid  SVE: 3/60/-2   SSPE: + Gross pooling        FHT: 150 bpm, moderate variability + accels no decels present - category I tracing.  Cunningham: Irregular  Sono: Cephalic, posterior placenta

## 2023-05-05 NOTE — OB PROVIDER H&P - HISTORY OF PRESENT ILLNESS
GUSATVO MORENO is a 32y  @ 37w4d by LMP: 8/15/2022. VICKI: 2023 presenting to L& D w/ complaints of pelvic pressure and LOF    Patient seen at her OBGYN office today for routine visit, and found to be 2cm. Began experiencing regular contraction like pain thereafter.  Reports good fetal movements. Denies any vaginal bleeding      Prenatal course complicated by:  Eczema exacerbations  PUPP  Rubella non-immune    OBhx: Denies  PMH: Eczema  PSH: Denies  Med: PNV  Allergies: NKDA

## 2023-05-05 NOTE — OB PROVIDER H&P - ASSESSMENT
TIFFANIEElisa  is a 32y  @ 37w4d by LMP: 8/15/2022. VICKI: 2023 presenting to L& D w/ complaints of pelvic pressure and LOF    Plan:  - Admit to L&D  - Consent  - Admission labs  - GBS negative  - Hx of PUPP, no acute interventions at this time. Benadryl prn  - Desires epidural, will consult anesthesiology  - Reactive tracing, irregular ctx. Consider pitocin augmentation if contractions remain irregular after epidural  - Continuous toco/FHT  - Maternal/fetal status reassuring    Plan d/w Dr. Garza and Dr. Luciano

## 2023-05-05 NOTE — OB RN PATIENT PROFILE - FALL HARM RISK - UNIVERSAL INTERVENTIONS
Bed in lowest position, wheels locked, appropriate side rails in place/Call bell, personal items and telephone in reach/Instruct patient to call for assistance before getting out of bed or chair/Non-slip footwear when patient is out of bed/[place] to call system/Physically safe environment - no spills, clutter or unnecessary equipment/Purposeful Proactive Rounding/Room/bathroom lighting operational, light cord in reach

## 2023-05-05 NOTE — OB RN DELIVERY SUMMARY - NS_SEPSISRSKCALC_OBGYN_ALL_OB_FT
EOS calculated successfully. EOS Risk Factor: 0.5/1000 live births ([facility] national incidence); GA=37w5d; Temp=100.22; ROM=15.05; GBS='Negative'; Antibiotics='No antibiotics or any antibiotics < 2 hrs prior to birth'

## 2023-05-05 NOTE — OB RN DELIVERY SUMMARY - NS_CORDBLDGASA_OBGYN_ALL_OB
Violet /wife informed of Dr Fox's advice. Wife agrees with the plan .  CC made a future appointment with Dr Fox 1/18/2017    Pebbles Leija RN / Clinical Care Coordinator     [address] [address]  [email or web address]  Office :  [phone] / Fax :  [phone]   Both arterial and venous

## 2023-05-05 NOTE — OB RN DELIVERY SUMMARY - NSSKINTOSKINA_OBGYN_ALL_OB_START_DATE
06-May-2023 06:41 O-T Advancement Flap Text: The defect edges were debeveled with a #15 scalpel blade.  Given the location of the defect, shape of the defect and the proximity to free margins an O-T advancement flap was deemed most appropriate.  Using a sterile surgical marker, an appropriate advancement flap was drawn incorporating the defect and placing the expected incisions within the relaxed skin tension lines where possible.    The area thus outlined was incised deep to adipose tissue with a #15 scalpel blade.  The skin margins were undermined to an appropriate distance in all directions utilizing iris scissors.

## 2023-05-05 NOTE — OB RN PATIENT PROFILE - NSICDXPASTMEDICALHX_GEN_ALL_CORE_FT
PAST MEDICAL HISTORY:  Fibroid uterus     H/O chlamydia infection     Ovarian cyst     Termination of pregnancy (fetus)

## 2023-05-05 NOTE — OB PROVIDER IHI INDUCTION/AUGMENTATION NOTE - NSNOTECOMPLETE_OBGYN_ALL_OB
From Artem France, Palliative Care NP, Cell [phone]    Andreas (how she pronounces her name) has cancer in her bones; she feels the pain in the posterior L ribs.  The Lidoderm patches work like magic she thinks, and the Dexamethasone will also help provide relief.  Prior to admission, she became quite confused after taking Vicodin, but she appears to be able to handle low dose (25 mg) Tramadol.  Bowels moved 2/14.      Code status is DNR/DNI.    She is to be enrolled with [facility] at the request of her daughter, [phone].    She is known to have a h/o essential thrombocythemia which had been treated with oral Agrylyn, which was discontinued on 2/14 due to her impending hospice enrollment.    
Yes

## 2023-05-06 ENCOUNTER — NON-APPOINTMENT (OUTPATIENT)
Age: 33
End: 2023-05-06

## 2023-05-06 LAB
COVID-19 SPIKE DOMAIN AB INTERP: POSITIVE
COVID-19 SPIKE DOMAIN ANTIBODY RESULT: >250 U/ML — HIGH
SARS-COV-2 IGG+IGM SERPL QL IA: >250 U/ML — HIGH
SARS-COV-2 IGG+IGM SERPL QL IA: POSITIVE
T PALLIDUM AB TITR SER: NEGATIVE — SIGNIFICANT CHANGE UP

## 2023-05-06 PROCEDURE — 59515 CESAREAN DELIVERY: CPT

## 2023-05-06 RX ORDER — FAMOTIDINE 10 MG/ML
20 INJECTION INTRAVENOUS ONCE
Refills: 0 | Status: COMPLETED | OUTPATIENT
Start: 2023-05-06 | End: 2023-05-06

## 2023-05-06 RX ORDER — KETOROLAC TROMETHAMINE 30 MG/ML
30 SYRINGE (ML) INJECTION ONCE
Refills: 0 | Status: DISCONTINUED | OUTPATIENT
Start: 2023-05-06 | End: 2023-05-06

## 2023-05-06 RX ORDER — DIPHENHYDRAMINE HCL 50 MG
25 CAPSULE ORAL EVERY 6 HOURS
Refills: 0 | Status: DISCONTINUED | OUTPATIENT
Start: 2023-05-06 | End: 2023-05-08

## 2023-05-06 RX ORDER — OXYCODONE HYDROCHLORIDE 5 MG/1
5 TABLET ORAL ONCE
Refills: 0 | Status: DISCONTINUED | OUTPATIENT
Start: 2023-05-06 | End: 2023-05-08

## 2023-05-06 RX ORDER — ONDANSETRON 8 MG/1
4 TABLET, FILM COATED ORAL EVERY 6 HOURS
Refills: 0 | Status: DISCONTINUED | OUTPATIENT
Start: 2023-05-06 | End: 2023-05-08

## 2023-05-06 RX ORDER — IBUPROFEN 200 MG
600 TABLET ORAL EVERY 6 HOURS
Refills: 0 | Status: COMPLETED | OUTPATIENT
Start: 2023-05-06 | End: 2024-04-03

## 2023-05-06 RX ORDER — OXYCODONE HYDROCHLORIDE 5 MG/1
5 TABLET ORAL
Refills: 0 | Status: DISCONTINUED | OUTPATIENT
Start: 2023-05-06 | End: 2023-05-08

## 2023-05-06 RX ORDER — DEXAMETHASONE 0.5 MG/5ML
4 ELIXIR ORAL EVERY 6 HOURS
Refills: 0 | Status: DISCONTINUED | OUTPATIENT
Start: 2023-05-06 | End: 2023-05-08

## 2023-05-06 RX ORDER — MAGNESIUM HYDROXIDE 400 MG/1
30 TABLET, CHEWABLE ORAL
Refills: 0 | Status: DISCONTINUED | OUTPATIENT
Start: 2023-05-06 | End: 2023-05-06

## 2023-05-06 RX ORDER — TETANUS TOXOID, REDUCED DIPHTHERIA TOXOID AND ACELLULAR PERTUSSIS VACCINE, ADSORBED 5; 2.5; 8; 8; 2.5 [IU]/.5ML; [IU]/.5ML; UG/.5ML; UG/.5ML; UG/.5ML
0.5 SUSPENSION INTRAMUSCULAR ONCE
Refills: 0 | Status: DISCONTINUED | OUTPATIENT
Start: 2023-05-06 | End: 2023-05-08

## 2023-05-06 RX ORDER — HYDROXYZINE HCL 10 MG
25 TABLET ORAL THREE TIMES A DAY
Refills: 0 | Status: DISCONTINUED | OUTPATIENT
Start: 2023-05-06 | End: 2023-05-08

## 2023-05-06 RX ORDER — CITRIC ACID/SODIUM CITRATE 300-500 MG
30 SOLUTION, ORAL ORAL ONCE
Refills: 0 | Status: COMPLETED | OUTPATIENT
Start: 2023-05-06 | End: 2023-05-06

## 2023-05-06 RX ORDER — SODIUM CHLORIDE 9 MG/ML
1000 INJECTION, SOLUTION INTRAVENOUS
Refills: 0 | Status: DISCONTINUED | OUTPATIENT
Start: 2023-05-06 | End: 2023-05-06

## 2023-05-06 RX ORDER — ACETAMINOPHEN 500 MG
1000 TABLET ORAL ONCE
Refills: 0 | Status: COMPLETED | OUTPATIENT
Start: 2023-05-06 | End: 2023-05-06

## 2023-05-06 RX ORDER — ACETAMINOPHEN 500 MG
975 TABLET ORAL
Refills: 0 | Status: DISCONTINUED | OUTPATIENT
Start: 2023-05-06 | End: 2023-05-08

## 2023-05-06 RX ORDER — OXYTOCIN 10 UNIT/ML
333.33 VIAL (ML) INJECTION
Qty: 20 | Refills: 0 | Status: DISCONTINUED | OUTPATIENT
Start: 2023-05-06 | End: 2023-05-06

## 2023-05-06 RX ORDER — KETOROLAC TROMETHAMINE 30 MG/ML
30 SYRINGE (ML) INJECTION EVERY 6 HOURS
Refills: 0 | Status: DISCONTINUED | OUTPATIENT
Start: 2023-05-06 | End: 2023-05-06

## 2023-05-06 RX ORDER — SIMETHICONE 80 MG/1
80 TABLET, CHEWABLE ORAL EVERY 4 HOURS
Refills: 0 | Status: DISCONTINUED | OUTPATIENT
Start: 2023-05-06 | End: 2023-05-06

## 2023-05-06 RX ORDER — SIMETHICONE 80 MG/1
80 TABLET, CHEWABLE ORAL EVERY 4 HOURS
Refills: 0 | Status: DISCONTINUED | OUTPATIENT
Start: 2023-05-06 | End: 2023-05-08

## 2023-05-06 RX ORDER — MAGNESIUM HYDROXIDE 400 MG/1
30 TABLET, CHEWABLE ORAL
Refills: 0 | Status: DISCONTINUED | OUTPATIENT
Start: 2023-05-06 | End: 2023-05-08

## 2023-05-06 RX ORDER — FAMOTIDINE 10 MG/ML
20 INJECTION INTRAVENOUS ONCE
Refills: 0 | Status: DISCONTINUED | OUTPATIENT
Start: 2023-05-06 | End: 2023-05-08

## 2023-05-06 RX ORDER — OXYTOCIN 10 UNIT/ML
333.33 VIAL (ML) INJECTION
Qty: 20 | Refills: 0 | Status: DISCONTINUED | OUTPATIENT
Start: 2023-05-06 | End: 2023-05-08

## 2023-05-06 RX ORDER — LANOLIN
1 OINTMENT (GRAM) TOPICAL EVERY 6 HOURS
Refills: 0 | Status: DISCONTINUED | OUTPATIENT
Start: 2023-05-06 | End: 2023-05-06

## 2023-05-06 RX ORDER — AZITHROMYCIN 500 MG/1
500 TABLET, FILM COATED ORAL ONCE
Refills: 0 | Status: COMPLETED | OUTPATIENT
Start: 2023-05-06 | End: 2023-05-06

## 2023-05-06 RX ORDER — CEFAZOLIN SODIUM 1 G
2000 VIAL (EA) INJECTION ONCE
Refills: 0 | Status: COMPLETED | OUTPATIENT
Start: 2023-05-06 | End: 2023-05-06

## 2023-05-06 RX ORDER — OXYCODONE HYDROCHLORIDE 5 MG/1
5 TABLET ORAL
Refills: 0 | Status: COMPLETED | OUTPATIENT
Start: 2023-05-06 | End: 2023-05-13

## 2023-05-06 RX ORDER — CEFAZOLIN SODIUM 1 G
2000 VIAL (EA) INJECTION ONCE
Refills: 0 | Status: DISCONTINUED | OUTPATIENT
Start: 2023-05-06 | End: 2023-05-06

## 2023-05-06 RX ORDER — NALOXONE HYDROCHLORIDE 4 MG/.1ML
0.1 SPRAY NASAL
Refills: 0 | Status: DISCONTINUED | OUTPATIENT
Start: 2023-05-06 | End: 2023-05-08

## 2023-05-06 RX ORDER — IBUPROFEN 200 MG
600 TABLET ORAL EVERY 6 HOURS
Refills: 0 | Status: DISCONTINUED | OUTPATIENT
Start: 2023-05-06 | End: 2023-05-08

## 2023-05-06 RX ORDER — ACETAMINOPHEN 500 MG
975 TABLET ORAL
Refills: 0 | Status: DISCONTINUED | OUTPATIENT
Start: 2023-05-06 | End: 2023-05-06

## 2023-05-06 RX ORDER — KETOROLAC TROMETHAMINE 30 MG/ML
30 SYRINGE (ML) INJECTION EVERY 6 HOURS
Refills: 0 | Status: DISCONTINUED | OUTPATIENT
Start: 2023-05-06 | End: 2023-05-07

## 2023-05-06 RX ORDER — LANOLIN
1 OINTMENT (GRAM) TOPICAL EVERY 6 HOURS
Refills: 0 | Status: DISCONTINUED | OUTPATIENT
Start: 2023-05-06 | End: 2023-05-08

## 2023-05-06 RX ORDER — SODIUM CHLORIDE 9 MG/ML
1000 INJECTION, SOLUTION INTRAVENOUS
Refills: 0 | Status: DISCONTINUED | OUTPATIENT
Start: 2023-05-06 | End: 2023-05-08

## 2023-05-06 RX ORDER — ENOXAPARIN SODIUM 100 MG/ML
40 INJECTION SUBCUTANEOUS EVERY 24 HOURS
Refills: 0 | Status: DISCONTINUED | OUTPATIENT
Start: 2023-05-06 | End: 2023-05-08

## 2023-05-06 RX ORDER — ACETAMINOPHEN 500 MG
1000 TABLET ORAL ONCE
Refills: 0 | Status: DISCONTINUED | OUTPATIENT
Start: 2023-05-06 | End: 2023-05-08

## 2023-05-06 RX ADMIN — Medication 30 MILLIGRAM(S): at 18:04

## 2023-05-06 RX ADMIN — AZITHROMYCIN 255 MILLIGRAM(S): 500 TABLET, FILM COATED ORAL at 06:12

## 2023-05-06 RX ADMIN — Medication 1 TABLET(S): at 12:13

## 2023-05-06 RX ADMIN — Medication 30 MILLILITER(S): at 05:58

## 2023-05-06 RX ADMIN — Medication 25 MILLIGRAM(S): at 11:30

## 2023-05-06 RX ADMIN — Medication 30 MILLILITER(S): at 06:02

## 2023-05-06 RX ADMIN — Medication 2000 MILLIGRAM(S): at 06:12

## 2023-05-06 RX ADMIN — SODIUM CHLORIDE 125 MILLILITER(S): 9 INJECTION, SOLUTION INTRAVENOUS at 02:43

## 2023-05-06 RX ADMIN — Medication 0.25 MILLIGRAM(S): at 03:41

## 2023-05-06 RX ADMIN — Medication 1000 MILLIGRAM(S): at 06:00

## 2023-05-06 RX ADMIN — Medication 1000 MILLIUNIT(S)/MIN: at 07:58

## 2023-05-06 RX ADMIN — Medication 975 MILLIGRAM(S): at 20:11

## 2023-05-06 RX ADMIN — Medication 400 MILLIGRAM(S): at 05:28

## 2023-05-06 RX ADMIN — Medication 975 MILLIGRAM(S): at 15:07

## 2023-05-06 RX ADMIN — FAMOTIDINE 20 MILLIGRAM(S): 10 INJECTION INTRAVENOUS at 06:02

## 2023-05-06 RX ADMIN — Medication 30 MILLIGRAM(S): at 12:52

## 2023-05-06 RX ADMIN — Medication 4 MILLIGRAM(S): at 11:30

## 2023-05-06 RX ADMIN — Medication 30 MILLIGRAM(S): at 12:13

## 2023-05-06 NOTE — OB PROVIDER DELIVERY SUMMARY - NSLOWPPHRISK_OBGYN_A_OB
No previous uterine incision/[place] Pregnancy/Less than or equal to 4 previous vaginal births/No known bleeding disorder/No history of postpartum hemorrhage/No other PPH risks indicated

## 2023-05-06 NOTE — OB PROVIDER DELIVERY SUMMARY - NSCAT2TRACINGDETAIL_OBGYN_A_OB
Moderate Variability and Significant Decelerations Prolonged Deceleration greater than 3 minutes or bradycardia

## 2023-05-06 NOTE — OB PROVIDER LABOR PROGRESS NOTE - ASSESSMENT
Plan:  - VSS  - 7 min prolonged deceleration. Palpable tetanic contraction. Pitocin discontinued, IVF bolus, supplemental oxygen and patient repositioned with resolution  - Continue w/ expectant management  - Will reassess as clinically indicated   - Continuous FHT/toco  - Maternal/fetal status reassuring    Plan d/w Dr. Luciano

## 2023-05-06 NOTE — OB PROVIDER LABOR PROGRESS NOTE - ASSESSMENT
Plan:  - VSS  - Palpable tetanic contraction. Patient repositioned, IVF bolus, and terbutaline 0.25mg given with return to baseline  - FSE placed   - Patient counseled regarding possible c/s if continued prolonged decelerations noted. Pt verbalized understanding  - Continue w/ expectant management    Patient seen and plan d/w Dr. Luciano

## 2023-05-06 NOTE — OB PROVIDER DELIVERY SUMMARY - DELIVERY COMPLICATIONS; ABNORMAL FETAL HEART RATE
Intermittent Cat II / Cat 3 tracing with fetal tachycardia and fetal bradycardia. persistent Cat II fetal tachycardia and fetal bradycardia and prolonged decelerations and variable declerations.

## 2023-05-06 NOTE — OB PROVIDER LABOR PROGRESS NOTE - NS_OBIHIFHRDETAILS_OBGYN_ALL_OB_FT
180bpm baseline, moderate variability, + accels, prolonged decelerations with contraction (4m with serena of 120s)
150 bpm, moderate variability + accels, 7min decel present
155 bpm, moderate variability + accels 10 min deceleration noted
155 bpm, moderate variability + accels no decels present
150bpm baseline, moderate variability, + accelerations, no decelerations

## 2023-05-06 NOTE — OB PROVIDER LABOR PROGRESS NOTE - NS_SUBJECTIVE/OBJECTIVE_OBGYN_ALL_OB_FT
Patient seen at bedside.
Patient rechecked during a deceleration.
Patient examined at bedside.  Reporting rectal pressure w/ contractions
Patient feeling pressure

## 2023-05-06 NOTE — OB PROVIDER LABOR PROGRESS NOTE - ASSESSMENT
Patient was on her left side, during a contraction she had a decrease in fetal heart rate to the 120s from a baseline of 180 during her contraction. She was flipped to right lateral and the fetal heart rate returned to baseline. Patient remains afebrile. Progressing in fetal station. Continue to monitor fhrt during contractions. If fetus continues to be unable to tolerate contractions may need to proceed to the OR for primary  section. Patient was on her left side, during a contraction she had a decrease in fetal heart rate to the 120s from a baseline of 180 during her contraction. She was flipped to right lateral and the fetal heart rate returned to baseline. Patient remains afebrile. Progressing in fetal station. Continue to monitor fhrt during contractions. If fetus continues to be unable to tolerate contractions may need to proceed to the OR for primary  section.    ATTENDING ADDENDUM:  Patient seen and evaluated  Cat 2 tracing. Fetal tachycardia 180s. Area of 4 minute deceleration at approximately 445 AM. Moderate variability.    hx of prior prolonged deceleration at approximately 330 am; and prior deceleration at approximately 100 AM.  Patient is currently 10/100/+1 per resident w/ epidural  Patient is afebrile    a/p 31 y/o      #SROM in labor  #current fetal tachycardia   #Cat 2 tracing  -pitocin off since 100  -IV fluid bolus given; position changes provided  -discussed findings of fetal tracing with the patient and her partner  -discussed observing for now but considering primary  due to poor fetal tolerance of labor    DR. Vega [facility]

## 2023-05-06 NOTE — OB PROVIDER LABOR PROGRESS NOTE - ASSESSMENT
Plan:  - VSS  - Reactive tracing  - Patient making appropriate cervical change, will continue w/ expectant mgmt. Will reassess when clinically indicated  - Counseled regarding pushing technique  - Continuous FHT/[place]  - Maternal/fetal status reassuring    - Dr. Vega informed regarding clinical status

## 2023-05-06 NOTE — CHART NOTE - NSCHARTNOTEFT_GEN_A_CORE
Persistent fetal tachycardia noted on FHRT. Patient fully dilated, decision made to try practice pushing to see if the fetus tolerated pushing with hope for a vaginal delivery. Contractions far apart with out ability to augment with pitocin considering category II tracing. With pushing FHRT went down from a baseline in of 180 to a serena into the 80s. Recovered with resuscitative measures; however, given fetal station being +2 and too high for vacuum delivery, persistent category II tracing with fetal tachycardia and prolonged decelerations decision was made to proceed to the OR for an urgent primary  section. Fetal heart rate recovered and patient moved to OR. Patient counseled on the risks (including but not limited to damage to surrounding organs, infection, bleeding), benefits and alternatives to a  section. Patient expressed understanding of the plan and agrees. To proceed down to the OR with Dr. Vega. 33 y/o  @ term presented SROM. Given pitocin and epidural. She progressed in dilation. She had a prolonged deceleration at approximately 0100, 0330, and 0440; subsequently she had Persistent fetal tachycardia 180 bpm and then started having variable decelerations.   Pitocin has been off since 0100 and could not be restarted due to the tracing.    Patient fully dilated and + 1 station. Patient was given IV fluids and position changes to improve the tracing. Tracing improved to a baseline of 165 with moderate variability and no decels. The decision made to try practice pushing to see if the fetus tolerated pushing to see if a vaginal delivery was plausible. Contractions were far apart without ability to augment with pitocin due to the persistent category II tracing. Contractions are every 5-8 minutes. During pushing FHR decreased from from a baseline in of 180 to a serena into the 80s. FHR Recovered with resuscitative measures.      Patient had a good pushing effort and if the fetal tracing was reassuring she would certainly have a vaginal delivery. She was able to move the fetal head from +1 station to +2 stations over two contractions. Evaluated to consider a vacuum or forceps however the infrequent contractions and the inability to add pitocin make it a less viable option. Another concern with this tracing is the distance of the LDR (second floor) from the operative rooms (first floor) due to Hospital remodeling construction. After pushing for the second contraction and having a serena of 80s. Resuscitation efforts were employed and Fetal heart rate recovered and patient moved to OR. Patient counseled on the risks (including but not limited to damage to surrounding organs, infection, bleeding), benefits and alternatives to a  section. Patient expressed understanding of the plan and agrees. To proceed down to the OR with Dr. Vega.    Dr. Vega

## 2023-05-06 NOTE — OB PROVIDER DELIVERY SUMMARY - NSPROVIDERDELIVERYNOTE_OBGYN_ALL_OB_FT
Pt taken to the OR for ____ C/S due to _____ .  in labor.  Spinal anesthesia.  Low transverse  section was performed.  Delivered live male infant, vtx, through moderate amount of clear amniotic fluid.  No nuchal cord.  Uterus, tubes, ovaries WNL.   Hysterotomy was reapproximated with suture, excellent hemostasis obtained.  rectus muscle, and fascia were reapproximated with suture, excellent hemostasis obtained.  skin closed in subcuticular fashion    Sondheimer weight 2890g  Skin-to-skin initiated in OR and continued into RR.  APGAR 9/9.   QBL: 429cc  Uop: 500cc  No intraoperative complications  Dictation #[id number] Pt taken to the OR for primary C/S due to fetal intolerance of labor.    Spinal anesthesia.  Low transverse  section was performed.  Delivered live male infant, vtx, through moderate amount of clear amniotic fluid.  No nuchal cord.  Uterus, tubes, ovaries WNL.   Hysterotomy was reapproximated with suture, excellent hemostasis obtained.  rectus muscle, and fascia were reapproximated with suture, excellent hemostasis obtained.  skin closed in subcuticular fashion     weight 2890g  Skin-to-skin initiated in OR and continued into RR.  APGAR 9/9.   QBL: 429cc  Uop: 500cc  No intraoperative complications  Dictation #[id number]

## 2023-05-06 NOTE — OB RN INTRAOPERATIVE NOTE - NSSELHIDDEN_OBGYN_ALL_OB_FT
[NS_DeliveryAttending1_OBGYN_ALL_OB_FT:[id number]=],[NS_DeliveryRN_OBGYN_ALL_OB_FT:[id number]=]

## 2023-05-06 NOTE — OB PROVIDER DELIVERY SUMMARY - NSSELHIDDEN_OBGYN_ALL_OB_FT
[NS_DeliveryAttending1_OBGYN_ALL_OB_FT:[id number]=],[NS_DeliveryRN_OBGYN_ALL_OB_FT:[id number]=],[NS_DeliveryAssist1_OBGYN_ALL_OB_FT:[id number]=]

## 2023-05-06 NOTE — OB PROVIDER DELIVERY SUMMARY - NSMODERATEVAR_OBGYN_A_OB
Spoke with patient to advise patient on appointment time and suggested easier options to remove the sling for dressing and hygiene with patient voicing understanding.
> 50% of contractions for 1 hour or greater  in second stage of labor with abnormal progress (1cm decent per hour of pushing)

## 2023-05-07 ENCOUNTER — TRANSCRIPTION ENCOUNTER (OUTPATIENT)
Age: 33
End: 2023-05-07

## 2023-05-07 LAB
BASOPHILS # BLD AUTO: 0.01 K/UL — SIGNIFICANT CHANGE UP (ref 0–0.2)
BASOPHILS NFR BLD AUTO: 0.1 % — SIGNIFICANT CHANGE UP (ref 0–2)
BILIRUB UR QL STRIP: NORMAL
CLARITY UR: CLEAR
EOSINOPHIL # BLD AUTO: 0.08 K/UL — SIGNIFICANT CHANGE UP (ref 0–0.5)
EOSINOPHIL NFR BLD AUTO: 0.6 % — SIGNIFICANT CHANGE UP (ref 0–6)
GLUCOSE UR-MCNC: NORMAL
HCG UR QL: 0.2 EU/DL
HCT VFR BLD CALC: 26.9 % — LOW (ref 34.5–45)
HGB BLD-MCNC: 8.8 G/DL — LOW (ref 11.5–15.5)
HGB UR QL STRIP.AUTO: NORMAL
IMM GRANULOCYTES NFR BLD AUTO: 0.8 % — SIGNIFICANT CHANGE UP (ref 0–0.9)
KETONES UR-MCNC: ABNORMAL
LEUKOCYTE ESTERASE UR QL STRIP: ABNORMAL
LYMPHOCYTES # BLD AUTO: 1.66 K/UL — SIGNIFICANT CHANGE UP (ref 1–3.3)
LYMPHOCYTES # BLD AUTO: 11.8 % — LOW (ref 13–44)
MCHC RBC-ENTMCNC: 28.6 PG — SIGNIFICANT CHANGE UP (ref 27–34)
MCHC RBC-ENTMCNC: 32.7 GM/DL — SIGNIFICANT CHANGE UP (ref 32–36)
MCV RBC AUTO: 87.3 FL — SIGNIFICANT CHANGE UP (ref 80–100)
MEV IGG SER-ACNC: 20.8 AU/ML — SIGNIFICANT CHANGE UP
MEV IGG+IGM SER-IMP: POSITIVE — SIGNIFICANT CHANGE UP
MONOCYTES # BLD AUTO: 0.74 K/UL — SIGNIFICANT CHANGE UP (ref 0–0.9)
MONOCYTES NFR BLD AUTO: 5.3 % — SIGNIFICANT CHANGE UP (ref 2–14)
NEUTROPHILS # BLD AUTO: 11.44 K/UL — HIGH (ref 1.8–7.4)
NEUTROPHILS NFR BLD AUTO: 81.4 % — HIGH (ref 43–77)
NITRITE UR QL STRIP: NORMAL
PH UR STRIP: 6
PLATELET # BLD AUTO: 165 K/UL — SIGNIFICANT CHANGE UP (ref 150–400)
PROT UR STRIP-MCNC: NORMAL
RBC # BLD: 3.08 M/UL — LOW (ref 3.8–5.2)
RBC # FLD: 13.1 % — SIGNIFICANT CHANGE UP (ref 10.3–14.5)
SP GR UR STRIP: 1.02
WBC # BLD: 14.04 K/UL — HIGH (ref 3.8–10.5)
WBC # FLD AUTO: 14.04 K/UL — HIGH (ref 3.8–10.5)

## 2023-05-07 RX ORDER — IBUPROFEN 200 MG
600 TABLET ORAL EVERY 6 HOURS
Refills: 0 | Status: DISCONTINUED | OUTPATIENT
Start: 2023-05-07 | End: 2023-05-08

## 2023-05-07 RX ADMIN — Medication 975 MILLIGRAM(S): at 21:06

## 2023-05-07 RX ADMIN — ENOXAPARIN SODIUM 40 MILLIGRAM(S): 100 INJECTION SUBCUTANEOUS at 09:28

## 2023-05-07 RX ADMIN — Medication 600 MILLIGRAM(S): at 12:13

## 2023-05-07 RX ADMIN — Medication 30 MILLIGRAM(S): at 00:53

## 2023-05-07 RX ADMIN — Medication 1 TABLET(S): at 12:13

## 2023-05-07 RX ADMIN — Medication 975 MILLIGRAM(S): at 03:05

## 2023-05-07 RX ADMIN — Medication 600 MILLIGRAM(S): at 18:10

## 2023-05-07 RX ADMIN — Medication 30 MILLIGRAM(S): at 06:00

## 2023-05-07 RX ADMIN — Medication 975 MILLIGRAM(S): at 15:34

## 2023-05-07 RX ADMIN — Medication 975 MILLIGRAM(S): at 09:29

## 2023-05-07 NOTE — PROGRESS NOTE ADULT - ATTENDING COMMENTS
Patient seen and examined, agree with above. Pain well controlled, no complaints, recovering well. Vitals normal, H&H appropriate. Continue routine postpartum care.    Ernesto Day MD

## 2023-05-08 VITALS
TEMPERATURE: 98 F | DIASTOLIC BLOOD PRESSURE: 85 MMHG | HEART RATE: 88 BPM | RESPIRATION RATE: 16 BRPM | OXYGEN SATURATION: 100 % | SYSTOLIC BLOOD PRESSURE: 125 MMHG

## 2023-05-08 PROBLEM — Z86.19 PERSONAL HISTORY OF OTHER INFECTIOUS AND PARASITIC DISEASES: Chronic | Status: ACTIVE | Noted: 2023-05-05

## 2023-05-08 PROBLEM — Z33.2 ENCOUNTER FOR ELECTIVE TERMINATION OF PREGNANCY: Chronic | Status: ACTIVE | Noted: 2023-05-05

## 2023-05-08 PROBLEM — N83.209 UNSPECIFIED OVARIAN CYST, UNSPECIFIED SIDE: Chronic | Status: ACTIVE | Noted: 2023-05-05

## 2023-05-08 PROBLEM — D25.9 LEIOMYOMA OF UTERUS, UNSPECIFIED: Chronic | Status: ACTIVE | Noted: 2023-05-05

## 2023-05-08 PROCEDURE — 80053 COMPREHEN METABOLIC PANEL: CPT

## 2023-05-08 PROCEDURE — 86765 RUBEOLA ANTIBODY: CPT

## 2023-05-08 PROCEDURE — 86780 TREPONEMA PALLIDUM: CPT

## 2023-05-08 PROCEDURE — 86901 BLOOD TYPING SEROLOGIC RH(D): CPT

## 2023-05-08 PROCEDURE — 86769 SARS-COV-2 COVID-19 ANTIBODY: CPT

## 2023-05-08 PROCEDURE — 59050 FETAL MONITOR W/REPORT: CPT

## 2023-05-08 PROCEDURE — 36415 COLL VENOUS BLD VENIPUNCTURE: CPT

## 2023-05-08 PROCEDURE — 86850 RBC ANTIBODY SCREEN: CPT

## 2023-05-08 PROCEDURE — 86900 BLOOD TYPING SEROLOGIC ABO: CPT

## 2023-05-08 PROCEDURE — 85025 COMPLETE CBC W/AUTO DIFF WBC: CPT

## 2023-05-08 RX ORDER — OXYCODONE HYDROCHLORIDE 5 MG/1
5 TABLET ORAL
Refills: 0 | Status: DISCONTINUED | OUTPATIENT
Start: 2023-05-08 | End: 2023-05-08

## 2023-05-08 RX ORDER — IBUPROFEN 200 MG
1 TABLET ORAL
Qty: 28 | Refills: 0
Start: 2023-05-08 | End: 2023-05-14

## 2023-05-08 RX ORDER — ACETAMINOPHEN 500 MG
1 TABLET ORAL
Qty: 56 | Refills: 0
Start: 2023-05-08 | End: 2023-05-21

## 2023-05-08 RX ADMIN — Medication 975 MILLIGRAM(S): at 03:16

## 2023-05-08 RX ADMIN — Medication 975 MILLIGRAM(S): at 09:38

## 2023-05-08 RX ADMIN — Medication 975 MILLIGRAM(S): at 10:01

## 2023-05-08 RX ADMIN — OXYCODONE HYDROCHLORIDE 5 MILLIGRAM(S): 5 TABLET ORAL at 06:51

## 2023-05-08 RX ADMIN — Medication 600 MILLIGRAM(S): at 06:03

## 2023-05-08 RX ADMIN — OXYCODONE HYDROCHLORIDE 5 MILLIGRAM(S): 5 TABLET ORAL at 06:21

## 2023-05-08 RX ADMIN — Medication 600 MILLIGRAM(S): at 00:09

## 2023-05-08 NOTE — DISCHARGE NOTE OB - MEDICATION SUMMARY - MEDICATIONS TO TAKE
I will START or STAY ON the medications listed below when I get home from the hospital:    ibuprofen 600 mg oral tablet  -- 1 tab(s) by mouth every 6 hours  -- Indication: For pain    Tylenol 325 mg oral capsule  -- 1 cap(s) by mouth every 6 hours  -- Indication: For pain

## 2023-05-08 NOTE — DISCHARGE NOTE OB - CARE PLAN
Principal Discharge DX:	 delivery delivered  Assessment and plan of treatment:	Patient post-operatively had an uncomplicated hospital course. Her pain was well controlled. She is tolerating a regular diet. She is ambulating independently. She was able to void after removal of contreras. Labs and Vitals WNL upon discharge.     1) Please take ibuprofen and/or Tylenol as needed for pain as prescribed.  2) Nothing in the vagina for 6 weeks (including no sex, no tampons, and no douching).  3) Please call your doctor for a follow up your postpartum appointment in 2 weeks.  4) Please continue taking vitamins postpartum. Take iron and colace for acute blood loss anemia.  5) Please call the office sooner if you have heavy vaginal bleeding, severe abdominal pain, or fever > 100.4F.  6) You may resume regular daily activity as tolerated   1

## 2023-05-08 NOTE — PROGRESS NOTE ADULT - SUBJECTIVE AND OBJECTIVE BOX
GUSTAVO MORENO is a 32y  now POD#2 s/p primary  section at 37w5d in the setting of  persistent cat 2 tracing during IOL for PROM, otherwise uncomplicated delivery.    S:    No acute events overnight.   The patient has no complaints.  Pain controlled with current treatment regimen.   She is ambulating without difficulty and tolerating PO.   + flatus/-BM/+ voiding   She endorses appropriate lochia, which is decreasing.   She is breastfeeding without difficulty.   She denies fevers, chills, nausea and vomiting.   She denies lightheadedness, dizziness, palpitations, chest pain and SOB.     O:    Vital Signs Last 24 Hrs  T(C): 36.7 (07 May 2023 20:00), Max: 37 (07 May 2023 09:02)  T(F): 98.1 (07 May 2023 20:00), Max: 98.6 (07 May 2023 09:02)  HR: 80 (07 May 2023 20:00) (80 - 91)  BP: 123/83 (07 May 2023 20:00) (108/68 - 128/85)  RR: 18 (07 May 2023 20:00) (16 - 18)  SpO2: 98% (07 May 2023 20:00) (96% - 99%)      Gen: NAD, AOx3, resting comfortably on room air  Abdomen:  Soft, non-tender, non-distended  Incision: Clean/dry/intact with Steristrips in place   Uterus:  Fundus firm below umbilicus  VE:  Expectant lochia  Ext:  Non-tender and non-edematous                          8.8    14.04 )-----------( 165      ( 07 May 2023 04:47 )             26.9         135  |  102  |  9.7  ----------------------------<  99  3.8   |  20.0<L>  |  0.81    Ca    8.8      05 May 2023 22:40    TPro  6.4<L>  /  Alb  3.7  /  TBili  <0.2<L>  /  DBili  x   /  AST  15  /  ALT  14  /  AlkPhos  119    
GUSTAVO MORENO is a 32y  now POD1 s/p primary  section at 37w5d for persistent cat 2 tracing, IOL for PROM.    S:    No acute events overnight.   The patient has no complaints.  Pain controlled with current treatment regimen.   She is ambulating without difficulty and tolerating PO.   + flatus/-BM/+ voiding   She endorses appropriate lochia, which is decreasing.   She is breastfeeding without difficulty.   She denies fevers, chills, nausea and vomiting.   She denies lightheadedness, dizziness, palpitations, chest pain and SOB.     O:    T(C): 36.8 (23 @ 06:00), Max: 36.9 (23 @ 00:00)  HR: 89 (23 @ 06:00) (62 - 106)  BP: 108/68 (23 @ 06:00) (99/58 - 135/85)  RR: 16 (23 @ 06:00) (13 - 22)  SpO2: 96% (23 @ 06:00) (96% - 100%)    Gen: NAD, AOx3  Pulm: Resting comfortably on room air  Abdomen:  Soft, non-tender, non-distended, +bowel sounds  Incision: Clean/dry/intact with Steris in place   Uterus:  Fundus firm below umbilicus  VE:  Expectant lochia  Ext:  Non-tender and non-edematous                          8.8    14.04 )-----------( 165      ( 07 May 2023 04:47 )             26.9         135  |  102  |  9.7  ----------------------------<  99  3.8   |  20.0<L>  |  0.81    Ca    8.8      05 May 2023 22:40    TPro  6.4<L>  /  Alb  3.7  /  TBili  <0.2<L>  /  DBili  x   /  AST  15  /  ALT  14  /  AlkPhos  119

## 2023-05-08 NOTE — PROGRESS NOTE ADULT - ASSESSMENT
GUSTAVO MORENO is a 32y  now POD1 s/p primary  section at 37w5d for persistent cat 2 tracing, IOL for PROM.    -Vital signs stable  -Hgb: 10-.8 -> 8.8, start PO iron. No sx anemia.   -Voiding, tolerating PO, bowel function nml   -Advance care as tolerated   -Continue routine postpartum and postoperative care and education  -Healthy male infant, desires circumcision  -Dispo: Continue inpatient management
GUSTAVO MORENO is a 32y  now POD#2 s/p primary  section at 37w5d in the setting of  persistent cat 2 tracing during IOL for PROM, otherwise uncomplicated delivery.  -Vital signs stable  -Hgb: 10-.8 -> 8.8 Continue with PO iron. No sx anemia.   -Voiding, tolerating PO, bowel function nml   -Advance care as tolerated   -Continue routine postpartum and postoperative care and education  -Healthy male infant, circumcision completed   -Dispo: Consider for discharge home today

## 2023-05-08 NOTE — DISCHARGE NOTE OB - NS MD DC FALL RISK RISK
For information on Fall & Injury Prevention, visit: [email or web address] OR  [email or web address] OR  https://www.cdc.gov/steadi/patient.html

## 2023-05-08 NOTE — DISCHARGE NOTE OB - CARE PROVIDER_API CALL
Eunice Vega (DO; MPH)  Obstetrics and Gynecology  [address] [place] [address]  Phone: [phone]  Fax: [phone]  Follow Up Time: 2 weeks

## 2023-05-08 NOTE — PROGRESS NOTE ADULT - ATTENDING COMMENTS
Patient seen and examined by me.  Agree with resident note.  Pain well controlled.  Tolerating regular diet, no nausea or vomiting.  Breastfeeding and supplementing with bottles.  Minimal lochia.  Ambulating.  She is voiding without difficulty.  Passing flatus.  Denies HA, dizziness, CP, SOB, LE pain.  VSS.  Incision c/d/i.  Plan for DC home today.  DC instructions and call parameters reviewed.  RTO in 1 week for incision check.

## 2023-05-08 NOTE — DISCHARGE NOTE OB - PATIENT PORTAL LINK FT
You can access the FollowMyHealth Patient Portal offered by [facility] by registering at the following website: [email or web address]. By joining [employer]’s FollowMyHealth portal, you will also be able to view your health information using other applications (apps) compatible with our system.

## 2023-05-08 NOTE — DISCHARGE NOTE OB - HOSPITAL COURSE
GUSTAVO MORENO is a 32y  now s/p primary  section at 37w5d for persistent cat 2 tracing, IOL for PROM.

## 2023-05-08 NOTE — DISCHARGE NOTE OB - PLAN OF CARE
Patient post-operatively had an uncomplicated hospital course. Her pain was well controlled. She is tolerating a regular diet. She is ambulating independently. She was able to void after removal of contreras. Labs and Vitals WNL upon discharge.     1) Please take ibuprofen and/or Tylenol as needed for pain as prescribed.  2) Nothing in the vagina for 6 weeks (including no sex, no tampons, and no douching).  3) Please call your doctor for a follow up your postpartum appointment in 2 weeks.  4) Please continue taking vitamins postpartum. Take iron and colace for acute blood loss anemia.  5) Please call the office sooner if you have heavy vaginal bleeding, severe abdominal pain, or fever > 100.4F.  6) You may resume regular daily activity as tolerated

## 2023-05-10 ENCOUNTER — APPOINTMENT (OUTPATIENT)
Age: 33
End: 2023-05-10

## 2023-05-10 ENCOUNTER — NON-APPOINTMENT (OUTPATIENT)
Age: 33
End: 2023-05-10

## 2023-05-10 ENCOUNTER — APPOINTMENT (OUTPATIENT)
Dept: OBGYN | Facility: CLINIC | Age: 33
End: 2023-05-10
Payer: COMMERCIAL

## 2023-05-10 VITALS
HEIGHT: 63 IN | WEIGHT: 150 LBS | BODY MASS INDEX: 26.58 KG/M2 | DIASTOLIC BLOOD PRESSURE: 70 MMHG | SYSTOLIC BLOOD PRESSURE: 118 MMHG

## 2023-05-10 DIAGNOSIS — Z98.891 HISTORY OF UTERINE SCAR FROM PREVIOUS SURGERY: ICD-10-CM

## 2023-05-10 PROCEDURE — 0503F POSTPARTUM CARE VISIT: CPT

## 2023-05-10 NOTE — HISTORY OF PRESENT ILLNESS
[Pain is well-controlled] : pain is well-controlled [Clean/Dry/Intact] : clean, dry and intact [de-identified] : pt post c/s on 5/4/23\par Steri strips peeling off- pt thinks incision is opening.  [de-identified] : healing well, no defects

## 2023-05-10 NOTE — PLAN
[FreeTextEntry1] : pt reassured at this time this is normal healing process\par precautions reviewed.

## 2023-05-11 ENCOUNTER — APPOINTMENT (OUTPATIENT)
Dept: OBGYN | Facility: CLINIC | Age: 33
End: 2023-05-11

## 2023-05-11 ENCOUNTER — NON-APPOINTMENT (OUTPATIENT)
Age: 33
End: 2023-05-11

## 2023-05-12 ENCOUNTER — APPOINTMENT (OUTPATIENT)
Age: 33
End: 2023-05-12

## 2023-05-18 ENCOUNTER — NON-APPOINTMENT (OUTPATIENT)
Age: 33
End: 2023-05-18

## 2023-05-18 ENCOUNTER — APPOINTMENT (OUTPATIENT)
Dept: OBGYN | Facility: CLINIC | Age: 33
End: 2023-05-18
Payer: COMMERCIAL

## 2023-05-18 VITALS
BODY MASS INDEX: 26.58 KG/M2 | DIASTOLIC BLOOD PRESSURE: 76 MMHG | WEIGHT: 150 LBS | SYSTOLIC BLOOD PRESSURE: 118 MMHG | HEIGHT: 63 IN

## 2023-05-18 PROCEDURE — 0503F POSTPARTUM CARE VISIT: CPT

## 2023-05-18 NOTE — DISCUSSION/SUMMARY
[FreeTextEntry1] : 33 y/o now  s/p pCS at 37w5d on  for NRFHT. \par - War screen negative, mood appropriate, feels well taking care of baby\par - discussed continuing prenatal vitamins \par - discussed continued BF for 6 months, exclusively if possible (benefits explained) \par - discussed adequate diet and physical activity\par - contraception: does not desire \par - last pap: none on record \par \par #Superficial Hematomas \par - no current concern for infection, no drainage, skin incision intact, minimally tender \par - discussed self resolving nature over time \par - given precautions for infection and RTC \par - given conservative care measures: NSAID's, hot packs, massage \par \par She verbalized understanding and agreement with above counseling regarding differential diagnosis, evaluation, and plan. \par She was given time for questions/concerns which were all answered to her apparent satisfaction.\par All designated lab work for today drawn in office. \par \par RTO 4w for final PP visit\par ANNUAL planning to follow

## 2023-05-18 NOTE — PHYSICAL EXAM
[Appropriately responsive] : appropriately responsive [Alert] : alert [No Acute Distress] : no acute distress [Soft] : soft [Non-tender] : non-tender [Non-distended] : non-distended [Oriented x3] : oriented x3 [FreeTextEntry7] : incision intact, healing well, superficial hematomas noted superior and inferior to incision line along entire length, 2cm thickness on both sides, no signs of infection, no drainage/bleeding

## 2023-05-18 NOTE — HISTORY OF PRESENT ILLNESS
[N] : Patient does not use contraception [Y] : Positive pregnancy history [Menarche Age: ____] : age at menarche was [unfilled] [No] : Patient does not have concerns regarding sex [HIVDate] : 04/27/23 [TextBox_53] : NEG [SyphilisDate] : 04/27/23 [TextBox_58] : NEG [LMPDate] : 08/15/22 [PGHxTotal] : 2 [[facility] CenterxFulerm] : 1 [[facility] Centeriving] : 1 [PGHxABInduced] : 1 [FreeTextEntry1] : 08/15/22

## 2023-05-20 ENCOUNTER — EMERGENCY (EMERGENCY)
Facility: HOSPITAL | Age: 33
LOS: 1 days | Discharge: HOME | End: 2023-05-20
Attending: EMERGENCY MEDICINE
Payer: COMMERCIAL

## 2023-05-20 VITALS
SYSTOLIC BLOOD PRESSURE: 122 MMHG | RESPIRATION RATE: 18 BRPM | HEIGHT: 63 IN | HEART RATE: 93 BPM | DIASTOLIC BLOOD PRESSURE: 76 MMHG | TEMPERATURE: 98 F | WEIGHT: 139.99 LBS | OXYGEN SATURATION: 100 %

## 2023-05-20 DIAGNOSIS — Z98.890 OTHER SPECIFIED POSTPROCEDURAL STATES: Chronic | ICD-10-CM

## 2023-05-20 PROCEDURE — 99282 EMERGENCY DEPT VISIT SF MDM: CPT

## 2023-05-20 PROCEDURE — 99283 EMERGENCY DEPT VISIT LOW MDM: CPT

## 2023-05-20 NOTE — ED PROVIDER NOTE - NS ED ATTENDING STATEMENT MOD
This was a shared visit with the YOLA. I reviewed and verified the documentation and independently performed the documented: Attending Only

## 2023-05-20 NOTE — ED PROVIDER NOTE - NS ED ROS FT
Constitutional: (-) fever  (-)chills  (-)sweats  Eyes/ENT: (-) blurry vision, (-) epistaxis  (-)rhinorrhea   (-) sore throat    Cardiovascular: (-) chest pain, (-) palpitations (-) edema   Respiratory: (-) cough, (-) shortness of breath   Gastrointestinal: (-)nausea  (-)vomiting, (-) diarrhea  (+) abdominal pain (+) swelling over  site   :  (-)dysuria, (-)frequency, (-)urgency, (-)hematuria  Musculoskeletal: (-) neck pain, (-) back pain, (-) joint pain  Integumentary: (-) rash, (-) edema  Neurological: (-) headache, (-) altered mental status  (-)LOC Constitutional: (-) fever  (-)chills  (-)sweats  Cardiovascular: (-) chest pain, (-) palpitations (-) edema   Respiratory: (-) cough, (-) shortness of breath   Gastrointestinal: (-)nausea  (-)vomiting, (-) diarrhea  (+) abdominal pain (+) swelling over  site   Integumentary: (-) rash,   Neurological: (-)

## 2023-05-20 NOTE — ED PROVIDER NOTE - NSFOLLOWUPINSTRUCTIONS_ED_ALL_ED_FT
Postpartum Care After  Delivery  The following information offers guidance on how to care for yourself from the time you deliver your baby to 6–12 weeks after delivery (postpartum period). Your health care provider may also give you more specific instructions. If you have problems or questions, contact your health care provider.    How to care for yourself  Perineal care    A squirt bottle.  A toilet with a sitz bath sitting inside the toilet bowl.  If your  ( section) was unplanned, and you were allowed to labor and push before delivery, you may have pain, swelling, and discomfort in the tissue between your vaginal opening and your anus (perineum). You may also have an incision in the tissue (episiotomy) or the tissue may have torn during delivery. Follow these instructions as told by your health care provider:  Keep your perineum clean and dry. Use medicated pads and pain-relieving sprays and creams as directed.  If you have an episiotomy or vaginal tear, check the area every day for signs of infection. Check for:  Redness, swelling, or pain.  Fluid or blood.  Warmth.  Pus or a bad smell.  You may use a squirt bottle instead of wiping to clean the perineum area after you go to the bathroom. As you start healing, use the squirt bottle before wiping yourself. Make sure to wipe gently.  To relieve pain caused by an episiotomy, vaginal tear, or hemorrhoids, try taking a warm sitz bath 2–3 times a day. Use a portable sitz bath that you can put over the toilet. Make sure the water covers your buttocks and perineum when you sit on the seat.  Vaginal bleeding    It is normal to have vaginal bleeding (lochia) after delivery. Wear a sanitary pad to absorb vaginal bleeding and discharge.  During the first week after delivery, the amount and appearance of lochia is often similar to a menstrual period.  Over the next few weeks, it will slowly decrease to a dry, yellow-brown discharge.  For most women, lochia stops completely by 4–6 weeks after delivery. Vaginal bleeding can vary from woman to woman.  Change your sanitary pads frequently. Watch for any changes in your flow, such as:  A sudden increase in volume.  A change in color.  Large blood clots.  If you pass a blood clot, call your health care provider to discuss.  Do not use tampons or douches until your health care provider says it is safe.  If you are not breastfeeding, your period should return 6–8 weeks after delivery. If you are breastfeeding, the time when your period returns varies based on whether or not you are breastfeeding exclusively.  Breast care    Within the first few days after delivery, your breasts may feel heavy, full, and uncomfortable (breast engorgement). You may also have milk leaking from your breasts. Your health care provider can suggest ways to help relieve breast discomfort. Breast engorgement should go away within a few days.  If you are breastfeeding:  Wear a bra that supports your breasts and fits you well.  Keep your nipples clean and dry. Apply creams and ointments as told by your health care provider.  You may need to use breast pads to absorb milk leakage.  You may have uterine contractions every time you breastfeed for several weeks after delivery. Uterine contractions help your uterus return to its normal size.  If you have any problems with breastfeeding, work with your health care provider or a lactation consultant.  Take over-the-counter medicines as told by your health care provider to help with pain or discomfort.  If you are not breastfeeding:  Wear a well-fitting bra and use cold packs to help with swelling.  Do not squeeze out (express) milk. This causes you to make more milk.  Intimacy and sexuality    Ask your health care provider when you can engage in sexual activity.  You are able to get pregnant after delivery, even if you have not had your period. If desired, talk with your health care provider about methods of family planning or birth control (contraception).  Follow these instructions at home:  Medicines    Take over-the-counter and prescription medicines only as told by your health care provider.  If you were prescribed an antibiotic medicine, take it as told by your health care provider. Do not stop taking the antibiotic even if you start to feel better.  Take your prenatal vitamins until your postpartum checkup or until your health care provider tells you it is okay to stop taking them.  Activity    Return to your normal activities as told by your health care provider. Ask your health care provider what activities are safe for you.  You may have to avoid lifting. Ask your health care provider how much you can safely lift.  If possible, have someone help you at home until you are able to do your usual activities yourself.  Try to rest or take naps while your baby is sleeping.  General instructions    Drink enough fluid to keep your urine pale yellow.  Do not drink alcohol, especially if you are breastfeeding.  Do not use any products that contain nicotine or tobacco. These products include cigarettes, chewing tobacco, and vaping devices, such as e-cigarettes. If you need help quitting, ask your health care provider.  Keep all follow-up visits. This is important. This includes visits for you and your baby. Most women visit their health care provider for a postpartum checkup within the first 3–6 weeks after delivery.  Contact a health care provider if:  You have:  A fever.  Breasts that are painful, hard, or turn red.  Stopped breastfeeding and you have not had a menstrual period for 12 weeks after you stopped breastfeeding.  Not  at all and you have not had a menstrual period for 12 weeks after delivery.  Trouble holding urine or keeping urine from leaking.  You pass a blood clot from your vagina.  You have questions about caring for yourself or your baby.  You feel unable to cope with the changes that a new baby brings to your life, and these feelings do not go away. These include:  Feeling unusually sad or worried.  Having little or no interest in activities you used to enjoy.  Get help right away if:  You have:  Chest pain.  Difficulty breathing.  Pain, redness or swelling in an arm or leg.  Severe pain or cramping in your abdomen.  A bad-smelling vaginal discharge.  Fever or chills.  You bleed from your vagina so much that you fill more than one sanitary pad in one hour. Bleeding should not be heavier than your heaviest period.  You develop a severe headache.  You faint.  You have blurred vision or spots in your vision.  You have thoughts about hurting yourself or your baby.  These symptoms may be an emergency. Get help right away. Call 911.  Do not wait to see if the symptoms will go away.  Do not drive yourself to the hospital.  Get help right away if you feel like you may hurt yourself or others, or have thoughts about taking your own life. Go to your nearest emergency room or:  Call 911.  Call the National Suicide Prevention Lifeline at 1-822.698.1149 or 428. This is open 24 hours a day.  Text the Crisis Text Line at 055430.  Summary  The period of time from when you deliver your baby to up to 6–12 weeks after delivery is called the postpartum period.  Contact your health care provider if you have questions.  Keep all follow-up visits for you and your baby. This is important.

## 2023-05-20 NOTE — CHART NOTE - NSCHARTNOTEFT_GEN_A_CORE
Elisa Landon is a 33 y/o now  s/p pCS at 37w5d on  for NRFHT presenting to the ED for evaluation of her  section scar.   Patient seen by Dr. Valle on 2023 for routine post-operative visit. At the time, stable hematoma noted along superior and inferior aspect of incision.   Patient states that the hematoma has been stable since discharge from hospital. Non-expanding in nature.  Patient denies any fevers, chills, headaches, myalgias, CP, SOB, or diarrhea. Denies any recent travel or recent sick contacts.     ICU Vital Signs Last 24 Hrs  T(C): 36.6 (20 May 2023 17:02), Max: 36.6 (20 May 2023 17:02)  T(F): 97.9 (20 May 2023 17:02), Max: 97.9 (20 May 2023 17:02)  HR: 93 (20 May 2023 17:02) (93 - 93)  BP: 122/76 (20 May 2023 17:02) (122/76 - 122/76)  RR: 18 (20 May 2023 17:02) (18 - 18)  SpO2: 100% (20 May 2023 17:02) (100% - 100%)      PE:  Neurological: oriented x3.   Constitutional: appropriately responsive, alert and no acute distress.   Abdomen: soft, non-tender and non-distended. Uterus palpated below level of umbilicus  Incision: Well healing, superficial hematomas noted superior and inferior to incision line along entire length, 2cm thickness on both sides, no signs of infection, no drainage/bleeding.         Plan:  - VSS  - Stable hematoma, non-expanding in nature. No concern for infection.  - Patient counseled regarding abdominal binder. Counseled regarding conservative management i.e., massages, warm compresses, OTC pain medication for relief  - No additional interventions at this time      Plan d/w Dr. Valle

## 2023-05-20 NOTE — ED PROVIDER NOTE - CLINICAL SUMMARY MEDICAL DECISION MAKING FREE TEXT BOX
(ROZINA Yancey MD) Initial Assessment: 33 y/o female s/p  2 weeks ago  ACP to complete summary of medical encounter below.    Summary of Clinical Encounter: (ROZINA Yancey MD) Initial Assessment: 33 y/o female s/p  2 weeks ago now p/w edema over incision site. DDx includes seroma, hematoma, strain. OB resident called to further evaluate.   ACP to complete summary of medical encounter below.    Summary of Clinical Encounter: (ROZINA Yancey MD) Initial Assessment: 31 y/o female s/p  2 weeks ago now p/w edema over incision site. DDx includes seroma, hematoma, strain. OB resident called to further evaluate.     Summary of Clinical Encounter:  patient seen and evaluated by Ob who states patient is clear for discharge and incision is stable as see previously at outpatient visit 2 days ago. will dc with precautions.

## 2023-05-20 NOTE — ED PROVIDER NOTE - OBJECTIVE STATEMENT
HPI:  33yo F; with PMH signif for s/p  2 weeks ago; now p/w swelling to incision site and pain to left abdomen x4 days. Denies heavy lifting. Partner states pt was bending when cleaning house. Denies any associated n/v/d, f/c/s. Reports the pain starts from incision site and radiates up left abdomen. Pain is worse w/ movement and is a throbbing pain. Pt was prescribed Tylenol and has been taking it.    PMH: s/p  2 weeks ago   SOCIAL: -social EtOH use, denies tobacco/illicit drug use HPI:  33yo F; with PMH signif for s/p  2 wks ago; now p/w swelling to incision site and pain to left abdomen x4 days. Denies heavy lifting. Partner states pt was bending when cleaning house, but no heavy lifting. Denies any associated n/v/d, f/c/s. Reports the pain starts from incision site and radiates up left abdomen over past 4 days. Pain is worse w/ movement and is a throbbing pain. Pt was prescribed Motrin and has been taking it.    PMH: s/p  2 weeks ago   SOCIAL: -social EtOH use, denies tobacco/illicit drug use

## 2023-05-20 NOTE — ED PROVIDER NOTE - PHYSICAL EXAMINATION
General:     NAD  Head:     NC/AT, EOMI, oral mucosa moist  Neck:     trachea midline  Lungs:     CTA b/l, no w/r/r  CVS:     S1S2, RRR, no m/g/r  Abd:      incision  with no wound dehiscence, area over left portion of incision with induration about 4cm mildly fluctuance, no erythematous or warm to touch  Ext:    2+ radial and pedal pulses, no c/c/e  Neuro: AAOx3, no sensory/motor deficits General:     NAD  Head:     NC/AT, EOMI, oral mucosa moist  Neck:     trachea midline  Lungs:     CTA b/l, no w/r/r  CVS:     S1S2, RRR, no m/g/r  Abd:      incision  with no wound dehiscence, area over left portion of incision with induration about 4cm mildly fluctuance, no erythematous or warm to touch

## 2023-05-20 NOTE — ED PROVIDER NOTE - PATIENT PORTAL LINK FT
You can access the FollowMyHealth Patient Portal offered by [facility] by registering at the following website: [email or web address]. By joining [facility]’s FollowMyHealth portal, you will also be able to view your health information using other applications (apps) compatible with our system.

## 2023-05-23 ENCOUNTER — APPOINTMENT (OUTPATIENT)
Dept: OBGYN | Facility: CLINIC | Age: 33
End: 2023-05-23

## 2023-06-06 ENCOUNTER — NON-APPOINTMENT (OUTPATIENT)
Age: 33
End: 2023-06-06

## 2023-06-07 ENCOUNTER — NON-APPOINTMENT (OUTPATIENT)
Age: 33
End: 2023-06-07

## 2023-06-15 ENCOUNTER — NON-APPOINTMENT (OUTPATIENT)
Age: 33
End: 2023-06-15

## 2023-06-15 ENCOUNTER — APPOINTMENT (OUTPATIENT)
Dept: OBGYN | Facility: CLINIC | Age: 33
End: 2023-06-15
Payer: COMMERCIAL

## 2023-06-15 VITALS
BODY MASS INDEX: 23.57 KG/M2 | DIASTOLIC BLOOD PRESSURE: 72 MMHG | SYSTOLIC BLOOD PRESSURE: 114 MMHG | HEIGHT: 63 IN | WEIGHT: 133 LBS

## 2023-06-15 DIAGNOSIS — L30.9 DERMATITIS, UNSPECIFIED: ICD-10-CM

## 2023-06-15 DIAGNOSIS — O99.713 DISEASES OF THE SKIN AND SUBCUTANEOUS TISSUE COMPLICATING PREGNANCY, THIRD TRIMESTER: ICD-10-CM

## 2023-06-15 DIAGNOSIS — L29.9 DISEASES OF THE SKIN AND SUBCUTANEOUS TISSUE COMPLICATING PREGNANCY, THIRD TRIMESTER: ICD-10-CM

## 2023-06-15 PROCEDURE — 0503F POSTPARTUM CARE VISIT: CPT

## 2023-06-15 PROCEDURE — 99214 OFFICE O/P EST MOD 30 MIN: CPT | Mod: 24

## 2023-06-15 RX ORDER — CLOBETASOL PROPIONATE 0.5 MG/G
0.05 CREAM TOPICAL 3 TIMES DAILY
Qty: 1 | Refills: 3 | Status: ACTIVE | COMMUNITY
Start: 2023-06-15 | End: 1900-01-01

## 2023-06-15 NOTE — HISTORY OF PRESENT ILLNESS
[N] : Patient is not sexually active [Y] : Positive pregnancy history [Menarche Age: ____] : age at menarche was [unfilled] [No] : Patient does not have concerns regarding sex [Previously active] : previously active [PapSmeardate] : UNKNOWN DATE [LMPDate] : 08/15/22 [PGHxTotal] : 2 [[facility] CenterxFulerm] : 1 [[facility] Scott[facility]aleiving] : 1 [PGHxABInduced] : 1 [FreeTextEntry1] : 08/15/22

## 2023-06-15 NOTE — DISCUSSION/SUMMARY
[FreeTextEntry1] : 33 y/o now  s/p pCS at 37w5d on  for NRFHT. \par - [place] screen negative, mood appropriate, feels well taking care of baby\par - discussed continuing prenatal vitamins \par - discussed continued BF for 6 months, exclusively if possible (benefits explained) \par - discussed adequate diet and physical activity\par - contraception: does not desire \par - last pap: none on record \par \par #Superficial Hematomas \par - no current concern for infection, no drainage, skin incision intact, minimally tender \par - improved from last visit \par - she continues conservative management at home, reinforced today \par \par #Eczema, dermatitis during pregnancy \par - persistent \par - patient would like to trial high potency topical steroids now that she is not pregnant anymore \par - Clobetasol 0.05% topical cream sent to pharmacy \par - discussed use and precautions \par - will f/u with derm \par \par She verbalized understanding and agreement with above counseling regarding differential diagnosis, evaluation, and plan. \par She was given time for questions/concerns which were all answered to her apparent satisfaction.\par All designated lab work for today drawn in office. \par \par RTO 1-2 months for ANNUAL/PAP

## 2023-06-15 NOTE — PHYSICAL EXAM
[Appropriately responsive] : appropriately responsive [Alert] : alert [No Acute Distress] : no acute distress [Soft] : soft [Non-tender] : non-tender [Non-distended] : non-distended [Oriented x3] : oriented x3 [FreeTextEntry7] : incision intact and healing well, no bleeding or drainage, no signs of inflammation/infection; firm fundus below umbilicus; superficial hematoma improved from last visit now only on superior side of incision, left lateral aspect about 5cm in length and 2cm in width, no tenderness

## 2023-07-01 ENCOUNTER — NON-APPOINTMENT (OUTPATIENT)
Age: 33
End: 2023-07-01

## 2023-07-06 ENCOUNTER — APPOINTMENT (OUTPATIENT)
Dept: OBGYN | Facility: CLINIC | Age: 33
End: 2023-07-06
Payer: COMMERCIAL

## 2023-07-06 VITALS
DIASTOLIC BLOOD PRESSURE: 62 MMHG | HEIGHT: 63 IN | SYSTOLIC BLOOD PRESSURE: 110 MMHG | BODY MASS INDEX: 23.71 KG/M2 | WEIGHT: 133.8 LBS

## 2023-07-06 DIAGNOSIS — Z09 ENCOUNTER FOR FOLLOW-UP EXAMINATION AFTER COMPLETED TREATMENT FOR CONDITIONS OTHER THAN MALIGNANT NEOPLASM: ICD-10-CM

## 2023-07-06 DIAGNOSIS — T14.8XXA OTHER INJURY OF UNSPECIFIED BODY REGION, INITIAL ENCOUNTER: ICD-10-CM

## 2023-07-06 PROCEDURE — 99214 OFFICE O/P EST MOD 30 MIN: CPT

## 2023-07-06 NOTE — PHYSICAL EXAM
[Appropriately responsive] : appropriately responsive [Alert] : alert [No Acute Distress] : no acute distress [Soft] : soft [Non-tender] : non-tender [Non-distended] : non-distended [Oriented x3] : oriented x3 [FreeTextEntry7] : incision improving in size, still present, skin closed, no signs of infection/inflammation, no bleeding/drainage

## 2023-07-06 NOTE — HISTORY OF PRESENT ILLNESS
[Y] : Positive pregnancy history [Menarche Age: ____] : age at menarche was [unfilled] [No] : Patient does not have concerns regarding sex [TextBox_31] : NONE ON RECORD [LMPDate] : 8/15/22 [PGxTotal] : 1 [[facility]annerxFulerm] : 1 [[facility])iving] : 1 [PGHxABInduced] : 1 [FreeTextEntry1] : 8/15/22

## 2023-07-06 NOTE — DISCUSSION/SUMMARY
[FreeTextEntry1] : 33 y/o now  s/p pCS at 37w5d on  for NRFHT. \par Patient reporting for incision check due to incisional hematoma. \par \par Healing well on exam. \par Continues conservative measures at home. \par Recommended massages to area to help with tissue laxity. \par Discussed healing expectations. \par Recommended 1 more week of conservative management, can return to full activity afterwards. \par \par She verbalized understanding and agreement with above counseling regarding differential diagnosis, evaluation, and plan. \par She was given time for questions/concerns which were all answered to her apparent satisfaction.\par All designated lab work for today drawn in office. \par \par RTO Aug for ANNUAL/PAP

## 2023-08-23 ENCOUNTER — NON-APPOINTMENT (OUTPATIENT)
Age: 33
End: 2023-08-23

## 2023-10-03 ENCOUNTER — APPOINTMENT (OUTPATIENT)
Dept: OBGYN | Facility: CLINIC | Age: 33
End: 2023-10-03

## 2023-10-30 NOTE — OB PROVIDER H&P - NS_FETALPRESSONO_OBGYN_ALL_OB
Cephalic Tetracycline Pregnancy And Lactation Text: This medication is Pregnancy Category D and not consider safe during pregnancy. It is also excreted in breast milk.

## 2024-05-10 ENCOUNTER — NON-APPOINTMENT (OUTPATIENT)
Age: 34
End: 2024-05-10

## 2024-05-28 NOTE — OB RN PREOPERATIVE CHECKLIST - NS_PREOPPTSENTTO_OBGYN_ALL_OB
Please call and see if patient was seen and if not how she is feeling and what symptoms she is having?   OR 1/operating room

## 2025-03-15 NOTE — OB PROVIDER H&P - NSTOBACCOSCREENHP_GEN_A_NCS
"Subjective:      Patient ID: Betzy Cooley is a 86 y.o. female.    Vitals:  height is 5' 1" (1.549 m) and weight is 44 kg (97 lb). Her oral temperature is 98 °F (36.7 °C). Her blood pressure is 165/82 (abnormal) and her pulse is 58 (abnormal). Her respiration is 18 and oxygen saturation is 97%.     Chief Complaint: Vaginal Discharge    This is a 86 y.o. female who presents today with a chief complaint of   Vaginal yeast symptoms. Discharge. Symptoms started a few days ago.     Vaginal Discharge  The patient's primary symptoms include genital itching and vaginal discharge. This is a new problem. The current episode started in the past 7 days. The problem has been gradually worsening. The pain is mild. She is not pregnant. Associated symptoms include frequency and urgency. Pertinent negatives include no constipation, fever, nausea or rash. The vaginal discharge was clear. There has been no bleeding. She has not been passing clots. She has not been passing tissue. The symptoms are aggravated by urinating. She has tried nothing for the symptoms. The treatment provided no relief.       Constitution: Negative for fever.   Gastrointestinal:  Negative for nausea and constipation.   Genitourinary:  Positive for frequency, urgency and vaginal discharge.   Skin:  Negative for rash.      Objective:     Physical Exam   Constitutional: No distress. obesity  Cardiovascular: Normal rate, regular rhythm, normal heart sounds and normal pulses.   Pulmonary/Chest: Effort normal and breath sounds normal.   Abdominal: Normal appearance.   Neurological: She is alert.   Vitals reviewed.      Assessment:     1. Candida vaginitis      By hx. Reports symptoms are consistent with previous yeast infection  Plan:       Candida vaginitis  -     POCT Urinalysis(Instrument)  -     fluconazole (DIFLUCAN) 150 MG Tab; Take 1 tablet (150 mg total) by mouth once daily. May repeat after 48 hours for 1 day  Dispense: 2 tablet; Refill: 0              " No